# Patient Record
Sex: MALE | Race: WHITE | NOT HISPANIC OR LATINO | Employment: OTHER | URBAN - METROPOLITAN AREA
[De-identification: names, ages, dates, MRNs, and addresses within clinical notes are randomized per-mention and may not be internally consistent; named-entity substitution may affect disease eponyms.]

---

## 2017-06-08 ENCOUNTER — OFFICE VISIT (OUTPATIENT)
Dept: FAMILY MEDICINE | Facility: CLINIC | Age: 67
End: 2017-06-08
Payer: MEDICARE

## 2017-06-08 VITALS
HEIGHT: 69 IN | DIASTOLIC BLOOD PRESSURE: 78 MMHG | HEART RATE: 71 BPM | WEIGHT: 170 LBS | BODY MASS INDEX: 25.18 KG/M2 | SYSTOLIC BLOOD PRESSURE: 127 MMHG

## 2017-06-08 DIAGNOSIS — R63.4 WEIGHT LOSS: ICD-10-CM

## 2017-06-08 DIAGNOSIS — K70.30 ALCOHOLIC CIRRHOSIS OF LIVER WITHOUT ASCITES: ICD-10-CM

## 2017-06-08 DIAGNOSIS — R05.9 COUGH: Primary | ICD-10-CM

## 2017-06-08 PROBLEM — N52.9 ED (ERECTILE DYSFUNCTION) OF ORGANIC ORIGIN: Status: ACTIVE | Noted: 2017-06-08

## 2017-06-08 PROBLEM — K74.60 HEPATIC CIRRHOSIS: Status: ACTIVE | Noted: 2017-06-08

## 2017-06-08 PROBLEM — Z79.899 OTHER LONG TERM (CURRENT) DRUG THERAPY: Status: ACTIVE | Noted: 2017-06-08

## 2017-06-08 PROBLEM — F17.200 CURRENT SMOKER: Status: ACTIVE | Noted: 2017-06-08

## 2017-06-08 PROBLEM — G25.81 RESTLESS LEG: Status: ACTIVE | Noted: 2017-06-08

## 2017-06-08 PROCEDURE — 99214 OFFICE O/P EST MOD 30 MIN: CPT | Mod: ,,, | Performed by: INTERNAL MEDICINE

## 2017-06-08 PROCEDURE — 1126F AMNT PAIN NOTED NONE PRSNT: CPT | Mod: ,,, | Performed by: INTERNAL MEDICINE

## 2017-06-08 PROCEDURE — 1159F MED LIST DOCD IN RCRD: CPT | Mod: ,,, | Performed by: INTERNAL MEDICINE

## 2017-06-08 RX ORDER — LACTULOSE 10 G/15ML
10 SOLUTION ORAL; RECTAL
COMMUNITY
Start: 2016-12-08 | End: 2021-10-29 | Stop reason: SDUPTHER

## 2017-06-08 RX ORDER — ATENOLOL 25 MG/1
TABLET ORAL
COMMUNITY
Start: 2016-12-08 | End: 2018-01-30

## 2017-06-08 RX ORDER — FUROSEMIDE 20 MG/1
TABLET ORAL
COMMUNITY
Start: 2016-12-08 | End: 2018-01-30

## 2017-06-08 RX ORDER — SPIRONOLACTONE 50 MG/1
TABLET, FILM COATED ORAL
COMMUNITY
Start: 2016-12-08 | End: 2018-01-30

## 2017-06-08 RX ORDER — NEOMYCIN SULFATE 500 MG/1
TABLET ORAL
COMMUNITY
Start: 2015-09-29

## 2017-06-08 NOTE — PATIENT INSTRUCTIONS
Cirrhosis    The liver is found on the right side of your abdomen, just below the rib cage. The liver has many essential functions. Among these, it filters toxins from the blood. It also helps blood clot to stop bleeding. Cirrhosis results from scarring and injury to the liver. This damage is permanent. It can lead to loss of liver function. At some point, the liver may stop working (liver failure).   Long-term heavy alcohol use and having hepatitis B or C are the two most common causes of cirrhosis. Other things that can damage the liver include toxins, certain medicines, and certain viruses.  Common symptoms of cirrhosis include:  · Tiredness, weakness  · Loss of appetite  · Nausea and vomiting  · Easy bleeding and bruising  · Abdominal swelling  · Weight loss  · Jaundice  · Itching  · Confusion  Treatment is aimed at managing symptoms and preventing further liver damage. Treatments may be given to fight the hepatitis virus. Quitting alcohol will help slow the progress of the disease and may prevent further complications. If cirrhosis progresses and becomes life threatening, a liver transplant may be an option in some cases.   Home care  · Avoid medicines that can worsen liver damage.  Your healthcare provider will explain if any of the medicines you now take need to be changed. Talk to you healthcare provider before taking any medicine, including mineral and vitamin supplements or herbs. Certain substances can worsen liver damage.  · Talk to your healthcare provider avoiding medicines containing acetaminophen or NSAIDs (such as ibuprofen and naproxen). These can affect your liver.   · Stop drinking alcohol. If you are dependent on alcohol or find it hard to stop drinking, seek professional help. Consider joining Alcoholics Anonymous or another type of treatment program for support.  · If you use IV drugs, you are at high risk for hepatitis B and C. Seek help to stop.   Follow-up care  Follow up with your  healthcare provider or as advised by our staff.  For more information and to learn about support groups for people with liver disease, contact:  · [employer]  www.liverfoundation.org  635.487.9850  · [facility]  www.hepfi.org  084- 893-1029  When to seek medical advice  Call your healthcare provider for any of the following:  · Rapid weight gain with increased size of your abdomen or leg swelling  · Increasing jaundice (yellow color of skin or eyes)  · Excess bleeding from cuts or injuries  Date Last Reviewed: 6/22/2015  © 7480-3354 [employer]. [address] [place] [address]. All rights reserved. This information is not intended as a substitute for professional medical care. Always follow your healthcare professional's instructions.

## 2017-06-08 NOTE — PROGRESS NOTES
Subjective:       Patient ID: Adam Aguirre is a 66 y.o. male.    Chief Complaint: Cirrhosis (fup)    Mr. Adam Cifuentes is a 66-year-old  male who comes for follow-up. He has underlying history of cirrhosis of liver secondary to alcohol use. Currently he is abstinent and is doing fairly well with his medical conditions. He occasionally takes furosemide/spironolactone if his leg swelling increases.    We have been attempting to get lab work on him but for some reason labs keep on reporting as unsuitable specimen.    Patient gastroenterologist is Dr. Pérez.    Patient also has a cough which is not considered since the last few years. He is not on ACE inhibitors. He is a chronic smoker.      Cough   This is a chronic problem. The current episode started more than 1 year ago. The problem has been waxing and waning. The cough is non-productive. Pertinent negatives include no chills, fever, rash or shortness of breath. The treatment provided no relief. There is no history of environmental allergies.       Past Medical History:   Diagnosis Date    Cirrhosis     Dr. Pérze    ED (erectile dysfunction)     RLS (restless legs syndrome)      Social History     Social History    Marital status:      Spouse name: N/A    Number of children: N/A    Years of education: N/A     Occupational History    Not on file.     Social History Main Topics    Smoking status: Current Every Day Smoker     Packs/day: 1.00     Types: Cigarettes    Smokeless tobacco: Not on file      Comment: advised to quit    Alcohol use Yes    Drug use: No    Sexual activity: Yes     Partners: Female     Other Topics Concern    Not on file     Social History Narrative    No narrative on file     Past Surgical History:   Procedure Laterality Date    HEMORRHOID SURGERY      TONSILLECTOMY       Family History   Problem Relation Age of Onset    No Known Problems Mother     Heart disease Father     Prostate cancer Brother        Review of  Systems   Constitutional: Positive for unexpected weight change (patient has lost imately 16 pounds of weight since the last 3 years.). Negative for activity change, appetite change, chills, fatigue and fever.   HENT: Negative for congestion, sneezing and trouble swallowing.    Eyes: Negative for pain and visual disturbance.   Respiratory: Negative for cough, chest tightness and shortness of breath.    Cardiovascular: Negative for palpitations and leg swelling.   Gastrointestinal: Negative for abdominal distention, abdominal pain, blood in stool, constipation and diarrhea.        Patient has history of alcoholic cirrhosis of liver which is under control.   Endocrine: Negative for cold intolerance, heat intolerance, polydipsia, polyphagia and polyuria.   Genitourinary: Negative for dysuria, hematuria and scrotal swelling.   Musculoskeletal: Negative for arthralgias, back pain and gait problem.   Skin: Negative for pallor, rash and wound.   Allergic/Immunologic: Negative for environmental allergies, food allergies and immunocompromised state.   Neurological: Negative for dizziness, seizures, speech difficulty, light-headedness and numbness.   Hematological: Negative for adenopathy. Does not bruise/bleed easily.   Psychiatric/Behavioral: Negative for agitation, behavioral problems and confusion. The patient is not nervous/anxious.        Objective:      Physical Exam   Constitutional: He is oriented to person, place, and time. He appears well-developed.   HENT:   Head: Normocephalic and atraumatic.   Nose: Nose normal.   Mouth/Throat: Oropharynx is clear and moist. He has dentures (patient has both upper and lower dentures which are fitting jeferson.sonably well.). No oropharyngeal exudate.   Eyes: Conjunctivae and EOM are normal.   Neck: Normal range of motion. Neck supple. No JVD present. No tracheal deviation present. No thyromegaly present.   Cardiovascular: Normal rate, regular rhythm and normal heart sounds.  Exam  reveals no gallop and no friction rub.    No murmur heard.  Pulmonary/Chest: Effort normal and breath sounds normal. No respiratory distress. He has no wheezes. He has no rales.   Abdominal: Soft. Bowel sounds are normal. He exhibits no fluid wave, no ascites and no pulsatile midline mass. There is no hepatosplenomegaly. There is no tenderness. No hernia.   Musculoskeletal: Normal range of motion.   Neurological: He is alert and oriented to person, place, and time. He has normal reflexes.   Skin: Skin is warm and dry.   Some telangiectasia and spider veins are noted on the abdominal skin.   Psychiatric: He has a normal mood and affect.   Nursing note and vitals reviewed.      Assessment:       1. Cough    2. Alcoholic cirrhosis of liver without ascites    3. Weight loss         Plan:           Cough    Alcoholic cirrhosis of liver without ascites  -     Ammonia; Future; Expected date: 06/08/2017  -     CBC auto differential; Future; Expected date: 06/08/2017  -     Comprehensive metabolic panel; Future; Expected date: 06/08/2017  -     Prothrombin w/ INR and PTT; Future; Expected date: 06/08/2017  -     Ambulatory referral to Gastroenterology    Weight loss    At this point patient is doing fairly well. Labs are pending. He has a weight loss of approximately 16 pounds which is unaccounted for at this point. I'll review the initial labs for direction towards further investigations if needed. In the meantime he should see a gastrologist and update himself on colonoscopy/endoscopy if needed.    Patient has been advised to quit smoking. Chest x-ray will be done for cough.

## 2017-07-18 LAB
ALBUMIN SERPL-MCNC: 3.7 G/DL (ref 3.1–4.7)
ALP SERPL-CCNC: 94 IU/L (ref 40–104)
ALT (SGPT): 18 IU/L (ref 3–33)
APTT PPP: 38.3 SEC (ref 21.7–37.8)
AST SERPL-CCNC: 22 IU/L (ref 10–40)
BASOPHILS NFR BLD: 0 K/UL (ref 0–0.2)
BASOPHILS NFR BLD: 0.3 %
BILIRUB SERPL-MCNC: 1.2 MG/DL (ref 0.3–1)
BUN SERPL-MCNC: 9 MG/DL (ref 8–20)
CALCIUM SERPL-MCNC: 9 MG/DL (ref 7.7–10.4)
CHLORIDE: 99 MMOL/L (ref 98–110)
CO2 SERPL-SCNC: 28.5 MMOL/L (ref 22.8–31.6)
CREATININE: 0.8 MG/DL (ref 0.6–1.4)
EOSINOPHIL NFR BLD: 0.1 K/UL (ref 0–0.7)
EOSINOPHIL NFR BLD: 1.2 %
ERYTHROCYTE [DISTWIDTH] IN BLOOD BY AUTOMATED COUNT: 12.9 % (ref 11.7–14.9)
GLUCOSE: 93 MG/DL (ref 70–99)
GRAN #: 3.6 K/UL (ref 1.4–6.5)
GRAN%: 59.3 %
HCT VFR BLD AUTO: 43.9 % (ref 39–55)
HGB BLD-MCNC: 15.5 G/DL (ref 14–16)
IMMATURE GRANS (ABS): 0 K/UL (ref 0–1)
IMMATURE GRANULOCYTES: 0.3 %
INR PPP: 1.1
LYMPH #: 1.7 K/UL (ref 1.2–3.4)
LYMPH%: 27.8 %
MCH RBC QN AUTO: 35.5 PG (ref 25–35)
MCHC RBC AUTO-ENTMCNC: 35.3 G/DL (ref 31–36)
MCV RBC AUTO: 100.5 FL (ref 80–100)
MONO #: 0.7 K/UL (ref 0.1–0.6)
MONO%: 11.1 %
NUCLEATED RBCS: 0 %
PLATELET # BLD AUTO: 154 K/UL (ref 140–440)
PMV BLD AUTO: 9.8 FL (ref 8.8–12.7)
POTASSIUM SERPL-SCNC: 4.2 MMOL/L (ref 3.5–5)
PROT SERPL-MCNC: 7.2 G/DL (ref 6–8.2)
PROTHROMBIN TIME: 14.4 SEC (ref 11.3–15.2)
RBC # BLD AUTO: 4.37 M/UL (ref 4.3–5.9)
SODIUM: 134 MMOL/L (ref 134–144)
WBC # BLD AUTO: 6 K/UL (ref 5–10)

## 2017-08-21 PROBLEM — K57.30 DIVERTICULOSIS OF SIGMOID COLON: Status: ACTIVE | Noted: 2017-08-21

## 2017-08-21 PROBLEM — D36.9 ADENOMATOUS POLYPS: Status: ACTIVE | Noted: 2017-08-21

## 2017-09-01 PROBLEM — I85.10 SECONDARY ESOPHAGEAL VARICES WITHOUT BLEEDING: Status: ACTIVE | Noted: 2017-09-01

## 2018-01-25 ENCOUNTER — TELEPHONE (OUTPATIENT)
Dept: FAMILY MEDICINE | Facility: CLINIC | Age: 68
End: 2018-01-25

## 2018-01-26 DIAGNOSIS — K70.30 ALCOHOLIC CIRRHOSIS OF LIVER WITHOUT ASCITES: Primary | ICD-10-CM

## 2018-01-26 LAB
ALBUMIN SERPL-MCNC: 3.9 G/DL (ref 3.1–4.7)
ALP SERPL-CCNC: 93 IU/L (ref 40–104)
ALT (SGPT): 19 IU/L (ref 3–33)
AST SERPL-CCNC: 26 IU/L (ref 10–40)
BASOPHILS NFR BLD: 0 K/UL (ref 0–0.2)
BASOPHILS NFR BLD: 0.6 %
BILIRUB SERPL-MCNC: 1.1 MG/DL (ref 0.3–1)
BUN SERPL-MCNC: 7 MG/DL (ref 8–20)
CALCIUM SERPL-MCNC: 9 MG/DL (ref 7.7–10.4)
CHLORIDE: 101 MMOL/L (ref 98–110)
CO2 SERPL-SCNC: 30.4 MMOL/L (ref 22.8–31.6)
CREATININE: 0.81 MG/DL (ref 0.6–1.4)
EOSINOPHIL NFR BLD: 0.2 K/UL (ref 0–0.7)
EOSINOPHIL NFR BLD: 3.3 %
ERYTHROCYTE [DISTWIDTH] IN BLOOD BY AUTOMATED COUNT: 13.7 % (ref 11.7–14.9)
GLUCOSE: 92 MG/DL (ref 70–99)
GRAN #: 2.1 K/UL (ref 1.4–6.5)
GRAN%: 43.3 %
HCT VFR BLD AUTO: 39.4 % (ref 39–55)
HGB BLD-MCNC: 14.9 G/DL (ref 14–16)
IMMATURE GRANS (ABS): 0 K/UL (ref 0–1)
IMMATURE GRANULOCYTES: 0 %
LYMPH #: 2 K/UL (ref 1.2–3.4)
LYMPH%: 41.6 %
MCH RBC QN AUTO: 40.4 PG (ref 25–35)
MCHC RBC AUTO-ENTMCNC: 37.8 G/DL (ref 31–36)
MCV RBC AUTO: 106.8 FL (ref 80–100)
MONO #: 0.5 K/UL (ref 0.1–0.6)
MONO%: 11.2 %
NUCLEATED RBCS: 0 %
PLATELET # BLD AUTO: 165 K/UL (ref 140–440)
PMV BLD AUTO: 8.7 FL (ref 8.8–12.7)
POTASSIUM SERPL-SCNC: 4.1 MMOL/L (ref 3.5–5)
PROT SERPL-MCNC: 7.2 G/DL (ref 6–8.2)
RBC # BLD AUTO: 3.69 M/UL (ref 4.3–5.9)
SODIUM: 139 MMOL/L (ref 134–144)
WBC # BLD AUTO: 4.8 K/UL (ref 5–10)

## 2018-01-30 ENCOUNTER — OFFICE VISIT (OUTPATIENT)
Dept: FAMILY MEDICINE | Facility: CLINIC | Age: 68
End: 2018-01-30
Payer: MEDICARE

## 2018-01-30 VITALS
HEIGHT: 69 IN | SYSTOLIC BLOOD PRESSURE: 138 MMHG | DIASTOLIC BLOOD PRESSURE: 74 MMHG | BODY MASS INDEX: 24.59 KG/M2 | HEART RATE: 71 BPM | WEIGHT: 166 LBS

## 2018-01-30 DIAGNOSIS — R25.1 TREMOR OF RIGHT HAND: ICD-10-CM

## 2018-01-30 DIAGNOSIS — K70.30 ALCOHOLIC CIRRHOSIS OF LIVER WITHOUT ASCITES: Primary | ICD-10-CM

## 2018-01-30 DIAGNOSIS — D53.1 MEGALOBLASTIC RED BLOOD CELLS: ICD-10-CM

## 2018-01-30 DIAGNOSIS — D64.9 ANEMIA, UNSPECIFIED TYPE: ICD-10-CM

## 2018-01-30 DIAGNOSIS — E53.8 VITAMIN B12 DEFICIENCY: ICD-10-CM

## 2018-01-30 DIAGNOSIS — G25.81 RLS (RESTLESS LEGS SYNDROME): ICD-10-CM

## 2018-01-30 PROCEDURE — 1126F AMNT PAIN NOTED NONE PRSNT: CPT | Mod: ,,, | Performed by: INTERNAL MEDICINE

## 2018-01-30 PROCEDURE — 1159F MED LIST DOCD IN RCRD: CPT | Mod: ,,, | Performed by: INTERNAL MEDICINE

## 2018-01-30 PROCEDURE — 99214 OFFICE O/P EST MOD 30 MIN: CPT | Mod: ,,, | Performed by: INTERNAL MEDICINE

## 2018-01-30 PROCEDURE — 1170F FXNL STATUS ASSESSED: CPT | Mod: ,,, | Performed by: INTERNAL MEDICINE

## 2018-01-30 RX ORDER — ROPINIROLE 0.25 MG/1
0.25 TABLET, FILM COATED ORAL 3 TIMES DAILY
Qty: 30 TABLET | Refills: 4 | Status: SHIPPED | OUTPATIENT
Start: 2018-01-30 | End: 2018-03-01 | Stop reason: SDUPTHER

## 2018-01-30 NOTE — PROGRESS NOTES
Subjective:       Patient ID: Adam Aguirre is a 67 y.o. male.    Chief Complaint: Cirrhosis (lab review ); Shaking (restless leg ); and Tremors    Pt comes for follow up on alcoholic liver disease cirrhosis.  His condition is getting better gradually and has stopped most medications including diuretics, Atenolol.and Neomycin    He has RLS symptoms and some shakes in rt arm/hand    CBC shows elevated MCV. ( H/O cirrhosis)        Past Medical History:   Diagnosis Date    Cirrhosis     Dr. Pérez    Cirrhosis, alcoholic     off alcohol now    ED (erectile dysfunction)     RLS (restless legs syndrome)      Social History     Social History    Marital status:      Spouse name: N/A    Number of children: N/A    Years of education: N/A     Occupational History    retired [employer]      Social History Main Topics    Smoking status: Current Every Day Smoker     Packs/day: 0.50     Types: Cigarettes    Smokeless tobacco: Never Used      Comment: advised to quit    Alcohol use No      Comment: quit drinking 2008 March 25th    Drug use: No    Sexual activity: Yes     Partners: Female     Other Topics Concern    Not on file     Social History Narrative    No narrative on file     Past Surgical History:   Procedure Laterality Date    HEMORRHOID SURGERY      TONSILLECTOMY       Family History   Problem Relation Age of Onset    No Known Problems Mother     Heart disease Father     Prostate cancer Brother        Review of Systems   Constitutional: Positive for unexpected weight change (patient has lost imately 16+4 pounds of weight since the last 3 years..). Negative for activity change, appetite change, chills, fatigue and fever.   HENT: Negative for congestion, sneezing and trouble swallowing.    Eyes: Negative for pain and visual disturbance.   Respiratory: Negative for cough, chest tightness and shortness of breath.    Cardiovascular: Negative for palpitations and leg swelling.  "  Gastrointestinal: Negative for abdominal distention, abdominal pain, blood in stool, constipation and diarrhea.        Patient has history of alcoholic cirrhosis of liver which is under control.   Endocrine: Negative for cold intolerance, heat intolerance, polydipsia, polyphagia and polyuria.   Genitourinary: Negative for dysuria, hematuria and scrotal swelling.   Musculoskeletal: Negative for arthralgias, back pain and gait problem.   Skin: Negative for pallor, rash and wound.   Allergic/Immunologic: Negative for environmental allergies, food allergies and immunocompromised state.   Neurological: Positive for tremors (rt hand). Negative for dizziness, seizures, speech difficulty, light-headedness and numbness.        RLS symptoms and tremors rt hand   Hematological: Negative for adenopathy. Does not bruise/bleed easily.   Psychiatric/Behavioral: Negative for agitation, behavioral problems and confusion. The patient is not nervous/anxious.        Objective:       Vitals:    01/30/18 1318   BP: 138/74   Pulse: 71   Weight: 75.3 kg (166 lb)   Height: 5' 9" (1.753 m)     Physical Exam   Constitutional: He appears well-developed.   HENT:   Head: Normocephalic and atraumatic.   Nose: Nose normal.   Mouth/Throat: Oropharynx is clear and moist. He has dentures (patient has both upper and lower dentures which are fitting jeferson.sonably well.). No oropharyngeal exudate.   Eyes: Conjunctivae and EOM are normal.   Neck: Normal range of motion. Neck supple. No JVD present. No tracheal deviation present. No thyromegaly present.   Cardiovascular: Normal rate, regular rhythm and normal heart sounds.  Exam reveals no gallop and no friction rub.    No murmur heard.  Pulmonary/Chest: Effort normal and breath sounds normal. No respiratory distress. He has no wheezes. He has no rales.   Abdominal: Soft. Bowel sounds are normal. He exhibits no fluid wave, no ascites and no pulsatile midline mass. There is no hepatosplenomegaly. There is " no tenderness. No hernia.   Musculoskeletal: Normal range of motion.   Neurological: He is alert. He displays tremor.   Rt hand coarse tremor   Skin: Skin is warm and dry.   Some telangiectasia and spider veins are noted on the abdominal skin.   Psychiatric: He has a normal mood and affect.   Nursing note and vitals reviewed.      Assessment:       1. Alcoholic cirrhosis of liver without ascites    2. RLS (restless legs syndrome)    3. Tremor of right hand    4. Megaloblastic red blood cells    5. Anemia, unspecified type    6. Vitamin B12 deficiency      Adam Aguirre #[id number] (CSN: [id number])  (67 y.o. M)     Results    Comprehensive metabolic panel (Acc# [id number]) (Order [id number])   In Basket     Reviewed  Result Note  View in In Basket    MyChart Results Release     MyChart Status: Pending Results Release      Comprehensive metabolic panel   Order: [id number]   Status:  Final result   Visible to patient:  No (Not Released)   Next appt:  None   Dx:  Alcoholic cirrhosis of liver without ...    Ref Range & Units 4d ago   Glucose 70 - 99 mg/dL 92    BUN, Bld 8 - 20 mg/dL 7     Creatinine 0.60 - 1.40 mg/dL 0.81    Calcium 7.7 - 10.4 mg/dL 9.0    Sodium 134 - 144 mmol/L 139    Potassium 3.5 - 5.0 mmol/L 4.1    Chloride 98 - 110 mmol/L 101    CO2 22.8 - 31.6 mmol/L 30.4    Albumin 3.1 - 4.7 g/dL 3.9    Total Bilirubin 0.3 - 1.0 mg/dL 1.1     Alkaline Phosphatase 40 - 104 IU/L 93    Total Protein 6.0 - 8.2 g/dL 7.2    ALT (SGPT) 3 - 33 IU/L 19    AST 10 - 40 IU/L 26    Resulting Agency  [facility]      Specimen Collected: 01/26/18 15:08 Last Resulted: 01/26/18 15:35 Lab Flowsheet Order Details View Encounter Lab and Collection Details Routing Result History               Estimated Glomerular Filtration Rate   Order: [id number] - Reflex for Order [id number]   Status:  Final result   Visible to patient:  No (Not Released)   Next appt:  None   Narrative                                  VALUE             REFERENCE RANGE  UNITS                                          ----------------------------------------------------------------------------------  Estimated GFR                  95                               ml/min/1.73m2              WBC 5.0 - 10.0 K/uL 6.0    RBC 4.30 - 5.90 M/uL 4.37    Hemoglobin 14.0 - 16.0 g/dL 15.5    Hematocrit 39.0 - 55.0 % 43.9    MCV 80.0 - 100.0 fL 100.5     MCH 25.0 - 35.0 pg 35.5     MCHC 31.0 - 36.0 g/dL 35.3    RDW 11.7 - 14.9 % 12.9    Platelets 140 - 440 K/uL 154    MPV 8.8 - 12.7 fL 9.8    Gran% % 59.3    Lymph% % 27.8    Mono% % 11.1    Eosinophil% % 1.2    Basophil% % 0.3    Gran # (ANC) 1.4 - 6.5 K/uL 3.6    Lymph # 1.2 - 3.4 K/uL 1.7    Mono # 0.1 - 0.6 K/uL 0.7     Eos # 0.0 - 0.7 K/uL 0.1    Baso # 0.0 - 0.2 K/uL 0.0    Immature Grans (Abs) 0.0 - 1.0 K/uL 0.0    Immature Granulocytes % 0.3    nRBC% % 0      Ammonia   Order: [id number]   Status:  Final result   Visible to patient:  No (Not Released)   Next appt:  None   Dx:  Alcoholic cirrhosis of liver without ...   Narrative                                  VALUE             REFERENCE RANGE  UNITS                                         ----------------------------------------------------------------------------------  Ammonia                        10                         8-58  umol/L          Specimen Collected: 01/26/18 15:08 Last Resulted: 01/26/18 15:36 Order Details View Encounter Lab and Collection                    Plan:           Alcoholic cirrhosis of liver without ascites    RLS (restless legs syndrome)  -     Ferritin; Future; Expected date: 01/30/2018  -     rOPINIRole (REQUIP) 0.25 MG tablet; Take 1 tablet (0.25 mg total) by mouth 3 (three) times daily.  Dispense: 30 tablet; Refill: 4  -     Iron; Future; Expected date: 01/30/2018    Tremor of right hand    Megaloblastic red blood cells    Anemia, unspecified type  -     Ferritin; Future; Expected date: 01/30/2018  -     Iron; Future; Expected date: 01/30/2018    Vitamin B12  deficiency  -     Vitamin B12; Future; Expected date: 01/30/2018    Fup 1 month

## 2018-01-30 NOTE — PATIENT INSTRUCTIONS
Restless Legs Syndrome: What You Can Do  Symptoms of restless leg syndrome (RLS) can be treated. Together, you and your health care provider can work on your treatment plan. If needed, medications may be prescribed. Also learn what you can do to ease your discomfort. Good sleep habits and a healthy lifestyle will help you rest better at night and have more energy during the day.    Working with your health care provider  RLS may occur on its own and may be passed on in families. It is sometimes linked to other medical problems. Low iron may cause some RLS symptoms. Your health care provider may order a lab test to check your iron level. Other medical problems associated with RLS are kidney disease, diabetes, and multiple sclerosis. Your doctor may prescribe medications to reduce your symptoms and help you sleep better.  Tips for temporary relief  To reduce your discomfort, try the following:  · Walking or stretching  · Rubbing your legs  · Having a massage  · Taking a hot or cold bath  · Doing activities that make muscles in your hands or legs work  · Relaxing with yoga or meditation  Good sleep habits  Even though you have RLS, you can still have restful sleep. Try these good sleeping habits:  · Keep a regular sleep schedule. Go to bed and get up at the same time each day.  · Avoid or limit naps.  · Make sure the bedroom is quiet, dark, and not too hot or too cold.  · Use your bed only for sleep and sex.  Healthy lifestyle  Your lifestyle affects your health and your sleep. Here are some healthy habits:  · Eat a balanced diet. To get enough vitamins and minerals, you may also need to take supplements.  · Manage stress and learn ways to relax. Deep breathing techniques and visualization can help to relax your muscles and calm your mind.  · Exercise regularly. It can help reduce stress. Also, you will have more energy during the day and be more tired at bedtime. Afternoon exercise is best. Nighttime exercise may  affect how well you sleep.  · Avoid alcohol, nicotine, and caffeine.  Date Last Reviewed: 6/7/2015  © 4579-9455 [employer]. [address] [place] [address], [place] [address]. All rights reserved. This information is not intended as a substitute for professional medical care. Always follow your healthcare professional's instructions.        Understanding Restless Legs Syndrome    Are you ever annoyed by a creeping or itching feeling in your legs? Do you often feel an urge to move your legs while sitting or lying in bed? This can keep you from falling asleep at night. You may then feel tired during the day. If you have these problems, talk to your health care provider. He or she can suggest a treatment plan and help you find ways to sleep better.  Restless legs syndrome (RLS)  RLS is a creeping, crawly, or jumpy feeling in the legs with an urge to move them. Symptoms of RLS often occur during periods of inactivity, such as when you sit or lie down at night. This discomfort can keep you from falling asleep. RLS is more common in older people and tends to run in families. Overuse of caffeine or alcohol may make symptoms worse. Iron deficiency, diabetes, or kidney problems can contribute to RLS.  Periodic limb movement syndrome (PLMS)  PLMS is sudden, repetitive leg jerking during sleep. The person you sleep with is often the one who notices it. Your legs may jerk many times during the night. You and your partner may both have trouble sleeping and feel tired in the morning. PLMS shouldnt be confused with the normal leg or body twitching many people have when first falling asleep.  Treating these problems  If these problems are causing disrupted sleep and daytime symptoms, treatment may be needed. Possible treatments may include:  · Avoiding medications like antidepressants, antinausea medications, and antipsychotic medications.   · Prescribed medications.  · Lifestyle changes, such as controlling caffeine intake,  alcohol, and smoking.  Date Last Reviewed: 7/18/2015  © 8036-5535 [employer]. [address] [place] [address], [place] [address]. All rights reserved. This information is not intended as a substitute for professional medical care. Always follow your healthcare professional's instructions.        Essential Tremor (ET)  What is essential tremor?  Essential tremor (ET) is a neurological disorder that causes your hands, head, trunk, voice, or legs to shake rhythmically. It is often confused with Parkinson disease.  ET is the most common trembling disorder that people have. Everyone has some ET, but the movements usually cannot be seen or felt. When tremors are noticeable, the condition is classified as ET.  ET is most common among people older than age 65, but it can affect people at any age.  What causes ET?  ET can occur in different people for different reasons:  · Familial essential tremor. In most people, the condition seems to be passed down from a parent to a child. If your parent has ET, there is a 50% chance that you or your children will inherit the gene responsible for the condition.  · Essential tremor related to another disorder. Sometimes, a tremor is a symptom of another neurological disorder, such as Parkinson disease or dystonia. Sometimes, ET is mistaken for these other diseases when they are not present. A healthcare providers careful diagnosis is extremely important.  The cause of ET isnt known. However, one theory suggests that your cerebellum and other parts of your brain are not communicating correctly. The cerebellum is a part of the brain that controls muscle coordination.  What are the symptoms of ET?  If you have ET, you will have shaking and trembling at different times and in different situations, but some characteristics are common to all. Here is what you might typically experience:  · Tremors occur when you move and are less noticeable when you rest.  · Certain medicines,  caffeine, or stress can make your tremors worse.  · Tremor may improve with ingestion of a small amount of alcohol (such as wine)  · Tremors get worse as you age.  · Tremors dont affect both sides of your body in the same way.  Here are different signs of essential tremor:  · Tremors that are most obvious in your hands  · Difficulty doing tasks with your hands, such as writing or using tools  · Shaking or quivering sound in your voice  · Uncontrollable head-nodding  · In rare instances, tremors in your legs or feet  How is ET diagnosed?  Your rapid, uncontrollable trembling, as well as questions about your medical and family history, can help your healthcare provider determine if you have familial ET. He or she will probably need to rule out other conditions that could cause shaking or trembling. For example, tremors could be symptoms of diseases, such as hyperthyroidism. Your healthcare provider might test you for those, as well.  In some cases, the tremors might be related to other factors. To find out for certain, your healthcare provider may have you try to:  · Abstain from heavy alcohol use; if youre an alcoholic, trembling is a common symptom.  · Cut out cigarette smoking.  · Avoid caffeine.  · Avoid certain medicines  How is ET treated?   Propanolol and primidone are 2 medicines often prescribed to treat ET. Propanolol blocks the stimulating action of neurotransmitters to calm your trembling. Primidone is a common antiseizure medicine that also controls the actions of neurotransmitters.  Gabapentin and topiramate are 2 other antiseizure medicines that are sometimes prescribed. In some cases, tranquilizers like alprazolam or clonazepam might be suggested.  For ET in your hands, botulinum toxin (Botox) injections have shown some promise in easing the trembling. They work by weakening the surrounding muscles around your hands. For severe tremors, a stimulating device (deep brain stimulator) surgically  implanted in your brain may help.  Can ET be prevented?   The specific cause of ET is not known, so scientists are not sure how the condition can be prevented.  Living with ET  ET is usually not dangerous, but it can certainly be frustrating if you have to deal with it. Certain factors can make tremors worse, so the following steps may help to decrease tremors:  · Avoid alcohol, cigarettes, and caffeine  · Avoid stressful situations as much as possible  · Use relaxation techniques, such as yoga, deep-breathing exercises, or biofeedback  · Check with your healthcare provider to see if any medicines youre taking could be making your tremors worse.  Talk with your healthcare provider about other options, such as surgery, if ET starts to affect your quality of life.  When should I call my healthcare provider?  If you have been diagnosed with ET, talk with your healthcare provider about when you might need to call. He or she will likely advise you to call if your tremors become worse, or if you develop new neurologic symptoms, such as numbness or weakness.  Key points about essential tremors  · ET is a neurological disorder that causes your hands, head, trunk, voice, or legs to shake rhythmically. The cause is not known, but it is often passed down from a parent to a child.  · ET is sometimes confused with other types of tremor, so getting the right diagnosis is important.  · Tremors tend to be worse during movement than when at rest. The tremors are usually not dangerous, but they can get worse over time.  · Avoiding things that might make tremors worse, such as stress, caffeine, and certain medicines, may be helpful.  · Medicines can also help control or limit tremors in some people. Severe tremors can sometimes be treated with surgery.  Next steps  Tips to help you get the most from a visit to your healthcare provider:  · Know the reason for your visit and what you want to happen.  · Before your visit, write down  questions you want answered.  · Bring someone with you to help you ask questions and remember what your provider tells you.  · At the visit, write down the name of a new diagnosis, and any new medicines, treatments, or tests. Also write down any new instructions your provider gives you.  · Know why a new medicine or treatment is prescribed, and how it will help you. Also know what the side effects are.  · Ask if your condition can be treated in other ways.  · Know why a test or procedure is recommended and what the results could mean.  · Know what to expect if you do not take the medicine or have the test or procedure.  · If you have a follow-up appointment, write down the date, time, and purpose for that visit.  · Know how you can contact your provider if you have questions.  © 1630-6797 [facility]. [address] [place] [address], [place] [address]. All rights reserved. This information is not intended as a substitute for professional medical care. Always follow your healthcare professional's instructions.

## 2018-02-28 LAB
FERRITIN SERPL-MCNC: 211 NG/ML (ref 20–380)
IRON SERPL-MCNC: 125 MCG/DL (ref 50–180)
VIT B12 SERPL-MCNC: 566 PG/ML (ref 200–1100)

## 2018-03-01 ENCOUNTER — OFFICE VISIT (OUTPATIENT)
Dept: FAMILY MEDICINE | Facility: CLINIC | Age: 68
End: 2018-03-01
Payer: MEDICARE

## 2018-03-01 VITALS
WEIGHT: 167 LBS | SYSTOLIC BLOOD PRESSURE: 134 MMHG | BODY MASS INDEX: 24.73 KG/M2 | HEART RATE: 77 BPM | HEIGHT: 69 IN | DIASTOLIC BLOOD PRESSURE: 79 MMHG

## 2018-03-01 DIAGNOSIS — G25.81 RLS (RESTLESS LEGS SYNDROME): ICD-10-CM

## 2018-03-01 DIAGNOSIS — K70.30 ALCOHOLIC CIRRHOSIS OF LIVER WITHOUT ASCITES: Primary | ICD-10-CM

## 2018-03-01 DIAGNOSIS — D53.1 MEGALOBLASTIC RED BLOOD CELLS: ICD-10-CM

## 2018-03-01 DIAGNOSIS — R25.1 TREMOR OF RIGHT HAND: ICD-10-CM

## 2018-03-01 PROCEDURE — 99213 OFFICE O/P EST LOW 20 MIN: CPT | Mod: ,,, | Performed by: INTERNAL MEDICINE

## 2018-03-01 RX ORDER — MAGNESIUM 200 MG
1 TABLET ORAL DAILY
Qty: 100 TABLET | Refills: 3 | Status: SHIPPED | OUTPATIENT
Start: 2018-03-01

## 2018-03-01 RX ORDER — ROPINIROLE 0.25 MG/1
0.25 TABLET, FILM COATED ORAL NIGHTLY
Qty: 30 TABLET | Refills: 4
Start: 2018-03-01 | End: 2018-09-04 | Stop reason: SDUPTHER

## 2018-03-01 NOTE — PROGRESS NOTES
Subjective:       Patient ID: Adam Aguirre is a 67 y.o. male.    Chief Complaint: Cirrhosis (lab review ); Shaking; and Abnormal Lab    Mr. Medina is a pleasant 67-year-old  male who comes for follow-up. He has underlying cirrhosis of liver secondary to alcoholism. He has been abstinent since several years. Thus far he is improving significantly and steadily. He has stopped most of the medications including lactulose which she takes on a when necessary basis, propranolol and neomycin.    I've taken the opportunity to review his labs which show a persistently megaloblastic picture. He does take a multivitamin. His vitamin B-12 level is within normal range.     He also has symptoms of restless leg syndrome. He takes Requip at bedtime which seems to help him. I also checked his iron and ferritin levels which are within normal range.    Once in a while he gets a jerky movement in the right upper extremity which does not seem to bother him. There is no history of seizures. There is no family history of Parkinson's.        Past Medical History:   Diagnosis Date    Cirrhosis     Dr. Pérez    Cirrhosis, alcoholic     off alcohol now    ED (erectile dysfunction)     RLS (restless legs syndrome)      Social History     Social History    Marital status:      Spouse name: N/A    Number of children: N/A    Years of education: N/A     Occupational History    retired [employer]      Social History Main Topics    Smoking status: Current Every Day Smoker     Packs/day: 0.50     Types: Cigarettes    Smokeless tobacco: Never Used      Comment: advised to quit    Alcohol use No      Comment: quit drinking 2008 March 25th    Drug use: No    Sexual activity: Yes     Partners: Female     Other Topics Concern    Not on file     Social History Narrative    No narrative on file     Past Surgical History:   Procedure Laterality Date    HEMORRHOID SURGERY      TONSILLECTOMY       Family  "History   Problem Relation Age of Onset    No Known Problems Mother     Heart disease Father     Prostate cancer Brother        Review of Systems   Constitutional: Positive for unexpected weight change (Finally patient gained 1 pound of weight after approximately 20 pounds of weight loss.). Negative for activity change, appetite change, chills, fatigue and fever.   HENT: Negative for congestion, sneezing and trouble swallowing.    Eyes: Negative for pain and visual disturbance.   Respiratory: Negative for cough, chest tightness and shortness of breath.    Cardiovascular: Negative for palpitations and leg swelling.   Gastrointestinal: Negative for abdominal distention, abdominal pain, blood in stool, constipation and diarrhea.        Patient has history of alcoholic cirrhosis of liver which is under control.   Endocrine: Negative for cold intolerance, heat intolerance, polydipsia, polyphagia and polyuria.   Genitourinary: Negative for dysuria, hematuria and scrotal swelling.   Musculoskeletal: Negative for arthralgias, back pain and gait problem.   Skin: Negative for pallor, rash and wound.   Allergic/Immunologic: Negative for environmental allergies, food allergies and immunocompromised state.   Neurological: Positive for tremors (rt hand). Negative for dizziness, seizures, speech difficulty, light-headedness and numbness.        RLS symptoms and tremors rt hand   Hematological: Negative for adenopathy. Does not bruise/bleed easily.   Psychiatric/Behavioral: Negative for agitation, behavioral problems and confusion. The patient is not nervous/anxious.        Objective:       Vitals:    03/01/18 1409   BP: 134/79   Pulse: 77   Weight: 75.8 kg (167 lb)   Height: 5' 9" (1.753 m)     Physical Exam   Constitutional: He appears well-developed.   HENT:   Head: Normocephalic and atraumatic.   Nose: Nose normal.   Mouth/Throat: Oropharynx is clear and moist. He has dentures (patient has both upper and lower dentures which " are fitting jeferson.sonably well.). No oropharyngeal exudate.   Eyes: Conjunctivae and EOM are normal.   Neck: Normal range of motion. Neck supple. No JVD present. No tracheal deviation present. No thyromegaly present.   Cardiovascular: Normal rate and regular rhythm.  Exam reveals no gallop and no friction rub.    Murmur heard.   Systolic murmur is present with a grade of 2/6   Pulmonary/Chest: Effort normal and breath sounds normal. No respiratory distress. He has no wheezes. He has no rales.   Abdominal: Soft. Bowel sounds are normal. He exhibits no fluid wave, no ascites and no pulsatile midline mass. There is no hepatosplenomegaly. There is no tenderness. No hernia.   Musculoskeletal: Normal range of motion.   Neurological: He is alert. He displays tremor.   Rt hand coarse tremor   Skin: Skin is warm and dry.   Some telangiectasia and spider veins are noted on the abdominal skin.   Psychiatric: He has a normal mood and affect.   Nursing note and vitals reviewed.      Assessment:       Vitamin B-12 200 - 1,100 pg/mL 566      Iron 50 - 180 mcg/dL 125      Ferritin 20 - 380 ng/mL 211    Ammonia                        10                         8-58  umol/    Ammonia                        10                         8-58  umol/    Glucose 70 - 99 mg/dL 92  93      BUN, Bld 8 - 20 mg/dL 7   9     Creatinine 0.60 - 1.40 mg/dL 0.81  0.80     Calcium 7.7 - 10.4 mg/dL 9.0  9.0     Sodium 134 - 144 mmol/L 139  134     Potassium 3.5 - 5.0 mmol/L 4.1  4.2     Chloride 98 - 110 mmol/L 101  99     CO2 22.8 - 31.6 mmol/L 30.4  28.5     Albumin 3.1 - 4.7 g/dL 3.9  3.7     Total Bilirubin 0.3 - 1.0 mg/dL 1.1   1.2      Alkaline Phosphatase 40 - 104 IU/L 93  94     Total Protein 6.0 - 8.2 g/dL 7.2  7.2     ALT (SGPT) 3 - 33 IU/L 19  18     AST 10 - 40 IU/L 26  22        1. Alcoholic cirrhosis of liver without ascites    2. Tremor of right hand    3. Megaloblastic red blood cells    4. RLS (restless legs syndrome)         Plan:      "      Alcoholic cirrhosis of liver without ascites    Tremor of right hand    Megaloblastic red blood cells  -     cyanocobalamin, vitamin B-12, (VITAMIN B-12) 1,000 mcg Subl; Place 1 tablet under the tongue once daily.  Dispense: 100 tablet; Refill: 3    RLS (restless legs syndrome)  -     rOPINIRole (REQUIP) 0.25 MG tablet; Take 1 tablet (0.25 mg total) by mouth every evening.  Dispense: 30 tablet; Refill: 4    Importance of age-related immunization has been discussed and he will be "discussion perhaps at next visit.    Given his medical last picture I've advised him to take some over-the-counter vitamin B-12.    He continues to remain abstinent from alcohol and he has been complemented on that. At this point he does not seek any active treatment for jerky movements in right hand and this will be kept under observation.    Follow-up in 6 months or earlier.  "

## 2018-03-01 NOTE — PATIENT INSTRUCTIONS
Restless Legs Syndrome: What You Can Do  Symptoms of restless leg syndrome (RLS) can be treated. Together, you and your health care provider can work on your treatment plan. If needed, medications may be prescribed. Also learn what you can do to ease your discomfort. Good sleep habits and a healthy lifestyle will help you rest better at night and have more energy during the day.    Working with your health care provider  RLS may occur on its own and may be passed on in families. It is sometimes linked to other medical problems. Low iron may cause some RLS symptoms. Your health care provider may order a lab test to check your iron level. Other medical problems associated with RLS are kidney disease, diabetes, and multiple sclerosis. Your doctor may prescribe medications to reduce your symptoms and help you sleep better.  Tips for temporary relief  To reduce your discomfort, try the following:  · Walking or stretching  · Rubbing your legs  · Having a massage  · Taking a hot or cold bath  · Doing activities that make muscles in your hands or legs work  · Relaxing with yoga or meditation  Good sleep habits  Even though you have RLS, you can still have restful sleep. Try these good sleeping habits:  · Keep a regular sleep schedule. Go to bed and get up at the same time each day.  · Avoid or limit naps.  · Make sure the bedroom is quiet, dark, and not too hot or too cold.  · Use your bed only for sleep and sex.  Healthy lifestyle  Your lifestyle affects your health and your sleep. Here are some healthy habits:  · Eat a balanced diet. To get enough vitamins and minerals, you may also need to take supplements.  · Manage stress and learn ways to relax. Deep breathing techniques and visualization can help to relax your muscles and calm your mind.  · Exercise regularly. It can help reduce stress. Also, you will have more energy during the day and be more tired at bedtime. Afternoon exercise is best. Nighttime exercise may  affect how well you sleep.  · Avoid alcohol, nicotine, and caffeine.  Date Last Reviewed: 6/7/2015  © 5856-7218 [employer]. [address] [place] [address], [place] [address]. All rights reserved. This information is not intended as a substitute for professional medical care. Always follow your healthcare professional's instructions.

## 2018-09-04 ENCOUNTER — OFFICE VISIT (OUTPATIENT)
Dept: FAMILY MEDICINE | Facility: CLINIC | Age: 68
End: 2018-09-04
Payer: MEDICARE

## 2018-09-04 VITALS
SYSTOLIC BLOOD PRESSURE: 132 MMHG | WEIGHT: 164 LBS | DIASTOLIC BLOOD PRESSURE: 73 MMHG | BODY MASS INDEX: 24.29 KG/M2 | HEIGHT: 69 IN | HEART RATE: 73 BPM

## 2018-09-04 DIAGNOSIS — Z11.59 NEED FOR HEPATITIS C SCREENING TEST: ICD-10-CM

## 2018-09-04 DIAGNOSIS — M75.02 ADHESIVE CAPSULITIS OF LEFT SHOULDER: ICD-10-CM

## 2018-09-04 DIAGNOSIS — R25.1 TREMOR OF RIGHT HAND: ICD-10-CM

## 2018-09-04 DIAGNOSIS — E78.5 DYSLIPIDEMIA: ICD-10-CM

## 2018-09-04 DIAGNOSIS — G25.81 RLS (RESTLESS LEGS SYNDROME): ICD-10-CM

## 2018-09-04 DIAGNOSIS — K70.30 ALCOHOLIC CIRRHOSIS OF LIVER WITHOUT ASCITES: Primary | ICD-10-CM

## 2018-09-04 PROCEDURE — 99214 OFFICE O/P EST MOD 30 MIN: CPT | Mod: ,,, | Performed by: INTERNAL MEDICINE

## 2018-09-04 RX ORDER — ROPINIROLE 0.25 MG/1
0.5 TABLET, FILM COATED ORAL 3 TIMES DAILY
Qty: 270 TABLET | Refills: 3 | Status: SHIPPED | OUTPATIENT
Start: 2018-09-04 | End: 2019-10-25 | Stop reason: SDUPTHER

## 2018-09-04 NOTE — PROGRESS NOTES
Subjective:       Patient ID: Adam Aguirre is a 67 y.o. male.    Chief Complaint: Cirrhosis; RLS; and Shoulder Pain    Mr. Adam Cifuentes is a pleasant 67-year-old  male who comes for follow-up. He is underlying history of alcoholic cirrhosis of liver. Please note that he is currently abstinent completely. He's been doing fairly well within his circumstances. He has lost approximately 3 pounds of weight since his last visit. The weight seems to fluctuate a little bit here and little bit there.    Thus far he has been going okay. He takes his lactulose as needed whenever he feels that he might be little dazed or confused. He also takes neomycin in his been told that it's a gut vitamin.    Recently he seems to have some ache and pain in his left shoulder since a day or 2 and he feels that he might have slept the wrong way.    Otherwise is doing okay with Requip for restless leg.    Should also is experiencing left shoulder discomfort and is unable to raise the left shoulder. He does not recall any injury or trauma.    Patient's tremors continue on the right side of the hand. These are more or less the same. This has been going on for several years.    As far as preventive care issues are concerned, he is due for hepatitis C screening and lipid panel screening. I'm not sure if he had a hepatitis C screening done in past by Dr. Pérez but those results are not available at this time.    Past Medical History:   Diagnosis Date    Cirrhosis     Dr. Pérez    Cirrhosis, alcoholic     off alcohol now    ED (erectile dysfunction)     RLS (restless legs syndrome)      Social History     Socioeconomic History    Marital status:      Spouse name: Not on file    Number of children: Not on file    Years of education: Not on file    Highest education level: Not on file   Social Needs    Financial resource strain: Not on file    Food insecurity - worry: Not on file    Food insecurity - inability: Not on file     Transportation needs - medical: Not on file    Transportation needs - non-medical: Not on file   Occupational History    Occupation: retired [employer]   Tobacco Use    Smoking status: Current Every Day Smoker     Packs/day: 0.50     Types: Cigarettes    Smokeless tobacco: Never Used    Tobacco comment: advised to quit   Substance and Sexual Activity    Alcohol use: No     Comment: quit drinking 2008 March 25th    Drug use: No    Sexual activity: Yes     Partners: Female   Other Topics Concern    Not on file   Social History Narrative    Not on file     Past Surgical History:   Procedure Laterality Date    HEMORRHOID SURGERY      TONSILLECTOMY       Family History   Problem Relation Age of Onset    No Known Problems Mother     Heart disease Father     Prostate cancer Brother        Review of Systems   Constitutional: Positive for unexpected weight change (lost 3 lbs). Negative for activity change, appetite change, chills, fatigue and fever.   HENT: Negative for congestion, sneezing and trouble swallowing.    Eyes: Negative for pain and visual disturbance.   Respiratory: Negative for cough, chest tightness and shortness of breath.    Cardiovascular: Negative for palpitations and leg swelling.   Gastrointestinal: Negative for abdominal distention, abdominal pain, blood in stool, constipation and diarrhea.        Patient has history of alcoholic cirrhosis of liver which is under control.Takes Lactulose PRN   Endocrine: Negative for cold intolerance, heat intolerance, polydipsia, polyphagia and polyuria.   Genitourinary: Negative for dysuria, hematuria and scrotal swelling.   Musculoskeletal: Negative for arthralgias, back pain and gait problem.        Left shoulder pain and restricted range of motion.   Skin: Negative for pallor, rash and wound.   Allergic/Immunologic: Negative for environmental allergies, food allergies and immunocompromised state.   Neurological: Positive for  "tremors (rt hand) and numbness. Negative for dizziness, seizures, speech difficulty and light-headedness.        RLS symptoms and tremors rt hand  He also complains of feeling of "circulation been cut off"whenever he puts his arms in a different position.   Hematological: Negative for adenopathy. Does not bruise/bleed easily.   Psychiatric/Behavioral: Negative for agitation, behavioral problems and confusion. The patient is not nervous/anxious.          Objective:      Blood pressure (!) 142/76, pulse 73, height 5' 9" (1.753 m), weight 74.4 kg (164 lb). Body mass index is 24.22 kg/m².  Physical Exam   Constitutional: He appears well-developed.   HENT:   Head: Normocephalic and atraumatic.   Nose: Nose normal.   Mouth/Throat: Oropharynx is clear and moist. He has dentures (patient has both upper and lower dentures which are fitting jeferson.sonably well.). No oropharyngeal exudate.   Eyes: Conjunctivae and EOM are normal.   Neck: Normal range of motion. Neck supple. No JVD present. No tracheal deviation present. No thyromegaly present.   Cardiovascular: Normal rate and regular rhythm. Exam reveals no gallop and no friction rub.   Murmur heard.   Systolic murmur is present with a grade of 2/6.  Pulmonary/Chest: Effort normal and breath sounds normal. No respiratory distress. He has no wheezes. He has no rales.   Abdominal: Soft. Bowel sounds are normal. He exhibits no fluid wave, no ascites and no pulsatile midline mass. There is no hepatosplenomegaly. There is no tenderness. No hernia.   Musculoskeletal:        Left shoulder: He exhibits decreased range of motion. He exhibits no swelling, no effusion and no deformity.   Patient seems to have some decreased range of motion in the left shoulder. Peripheral pulses are good. Drop test is negative.   Neurological: He is alert. He displays tremor.   Rt hand coarse tremor-not much of her tremors seen on the left side.   Skin: Skin is warm and dry.   Some telangiectasia and " spider veins are noted on the abdominal skin.   Psychiatric: He has a normal mood and affect.   Nursing note and vitals reviewed.        Assessment:       1. Alcoholic cirrhosis of liver without ascites    2. Tremor of right hand    3. RLS (restless legs syndrome)    4. Adhesive capsulitis of left shoulder    5. Need for hepatitis C screening test    6. Dyslipidemia           No visits with results within 3 Month(s) from this visit.   Latest known visit with results is:   Office Visit on 01/30/2018   Component Date Value Ref Range Status    Ferritin 02/27/2018 211  20 - 380 ng/mL Final    Iron 02/27/2018 125  50 - 180 mcg/dL Final    Vitamin B-12 02/27/2018 566  200 - 1,100 pg/mL Final         Plan:           Alcoholic cirrhosis of liver without ascites  -     Ammonia; Future; Expected date: 09/04/2018  -     Comprehensive metabolic panel; Future; Expected date: 09/04/2018  -     CBC auto differential; Future; Expected date: 09/04/2018    Tremor of right hand    RLS (restless legs syndrome)    Adhesive capsulitis of left shoulder    Need for hepatitis C screening test  -     Hepatitis C antibody; Future; Expected date: 09/04/2018    Dyslipidemia  -     Lipid panel; Future; Expected date: 09/04/2018    Other orders  -     Cancel: Hepatitis C antibody; Future; Expected date: 09/04/2018    Patient seems to have some frozen shoulder symptoms. He might have some bursitis also. Drop test is negative. No history of injury or trauma. I recommend him some range of motion exercises for the next few weeks and if it does not get better he will let us know accordingly.    As far as alcoholic cirrhosis of liver is concerned, he is doing fairly well. He is completely abstinent from alcohol. He is fairly functional.    I'll check some labs including chemistry, hepatitis C antibody if not done and her lipid panel. Also check CBC. An ammonia levels. His previous gastroenterologist used to be Dr. Pérez was since treated  located.    Current Outpatient Medications:     cyanocobalamin, vitamin B-12, (VITAMIN B-12) 1,000 mcg Subl, Place 1 tablet under the tongue once daily., Disp: 100 tablet, Rfl: 3    lactulose (ENULOSE) 10 gram/15 mL solution, Take by mouth., Disp: , Rfl:     neomycin (MYCIFRADIN) 500 mg Tab, Take by mouth., Disp: , Rfl:     rOPINIRole (REQUIP) 0.25 MG tablet, Take 1 tablet (0.25 mg total) by mouth every evening., Disp: 30 tablet, Rfl: 4

## 2018-09-04 NOTE — PATIENT INSTRUCTIONS
Exercises for Shoulder Flexibility: External Rotation    This stretch can help restore shoulder flexibility and relieve pain over time. When stretching, be sure to breathe deeply. Follow any special instructions from your doctor or physical therapist:  1.  a doorway. Grasp the doorjamb with the hand on the frozen side. Your arm should be bent.  2. With the other hand, hold the elbow on the frozen side firmly against your body.  3. Standing in the same spot, rotate your body away from the doorjamb. Stop when you feel the stretch in the shoulder. At first, try to hold the stretch for 5 seconds.  4. Work up to doing 3 sets of this stretch, 3 times a day. Work up to holding the stretch for 30 to 60 seconds.  Note: Keep your arms as still as you can. Over time, rotate your body a little more to enhance the stretch. But be careful not to twist your back.  Frozen shoulder  Frozen shoulder is another name for adhesive capsulitis, which causes restricted movement in the shoulder. If you have frozen shoulder, this stretch may cause discomfort, especially when you first get started. A few months may pass before you achieve the results you want. But once your shoulder heals, it rarely becomes frozen again. So stick to your stretching program. If you have any questions, be sure to ask your doctor.   Date Last Reviewed: 8/16/2015 © 2000-2017 [employer]. [address] [place] [address], [place] [address]. All rights reserved. This information is not intended as a substitute for professional medical care. Always follow your healthcare professional's instructions.        Exercises for Shoulder Flexibility: Internal Rotation    This stretch can help restore shoulder flexibility and relieve pain over time. When stretching, be sure to breathe deeply. Follow any special instructions from your healthcare provider or physical therapist.  5. While seated, move the arm on the side you want to stretch toward the middle of  your back. The palm of your hand should face out.  6. Cup your other hand under the hand thats behind your back. Gently push your cupped hand upward until you feel the stretch in the shoulder. Try to hold the stretch for 5 seconds.  7. Work up to doing 3 sets of this stretch, 3 times a day. Work up to holding the stretch for 30 to 60 seconds.  Note: Keep your back straight. Its OK if your hand cant reach the middle of your back. Instead, start the stretch with your hand as close as you can get it to the middle of your back.     Frozen shoulder  Frozen shoulder is another name for adhesive capsulitis. This causes restricted movement in the shoulder. If you have frozen shoulder, this stretch may cause discomfort, especially when you first get started. A few months may pass before you achieve the results you want. But once your shoulder heals, it rarely becomes frozen again. So stick to your stretching program. If you have any questions, be sure to ask your healthcare provider.   Date Last Reviewed: 10/14/2015  © 2754-9305 [employer]. [address] [place] [address]. All rights reserved. This information is not intended as a substitute for professional medical care. Always follow your healthcare professional's instructions.        Exercises for Shoulder Flexibility: Adduction (Reaching Across)    This stretch can help restore shoulder flexibility and relieve pain over time. When stretching, be sure to breathe deeply. And follow any special instructions from your doctor or physical therapist:  8. Put the hand from the side you want to stretch on your opposite shoulder. Your elbow should point away from your body. Try to raise your elbow as close to shoulder height as you can.  9. With your other hand, push the raised elbow toward the opposite shoulder. Avoid turning your head. Stop when you feel the stretch. Try to hold the stretch for 5 seconds.  10. Work up to doing 3 sets of this  stretch, 3 times a day. Work up to holding the stretch for 30 to 60 seconds.  Note: Be sure to push your elbow across your chest, not up toward your chin. Over time, try to push your elbow farther across your chest to enhance the stretch.  Frozen shoulder  Frozen shoulder is another name for adhesive capsulitis, which causes restricted movement in the shoulder. If you have frozen shoulder, this stretch may cause discomfort, especially when you first get started. A few months may pass before you achieve the results you want. Once your shoulder heals, it rarely becomes frozen again. So stick to your stretching program. If you have any questions, be sure to ask your doctor.   Date Last Reviewed: 8/16/2015  © 7740-5777 [employer]. [address] [place] [address], [place] [address]. All rights reserved. This information is not intended as a substitute for professional medical care. Always follow your healthcare professional's instructions.        Exercises for Shoulder Flexibility: Back Scratch    Improving your flexibility can reduce pain. Stretching exercises also can help increase your range of pain-free motion. Breathe normally when you exercise. Try to use smooth, fluid movements. Never force a stretch.  Note: Follow any special instructions you are given. If you feel pain, stop the exercise. If the pain continues after stopping, call your healthcare provider.  · Stand straight, placing the back of your hand on the side you want to stretch flat against your lower back.  · Throw one end of a towel over your shoulder. Grab it behind your back with your other hand.  · Pull down gently on the towel with your front arm. Let your back arm slide up as high as is comfortable. Youll feel a stretch in your shoulder. Hold the stretch for a few seconds.  · Repeat 3 to 5 times. Build up to holding each stretch for 30 to 60 seconds.  For your safety, check with your healthcare provider before starting an exercise program.    Date Last Reviewed: 8/26/2015  © 2688-4150 [employer]. [address] [place] [address]. All rights reserved. This information is not intended as a substitute for professional medical care. Always follow your healthcare professional's instructions.        Exercises for Shoulder Flexibility: Wall Walk    Improving your flexibility can reduce pain. Stretching exercises also can help increase your range of pain-free motion. Breathe normally when you exercise. Use smooth, fluid movements.  Note: Follow any special instructions you are given. If you feel pain, stop the exercise. If the pain continues after stopping, call your healthcare provider:  · Stand with your shoulder about 2 feet from the wall.  · Raise your arm to shoulder level and gently walk your fingers up the wall as high as you can.  · Hold for a few seconds. Then walk your fingers back down.  · Repeat 3 times. Move closer to the wall as you repeat.  · Build up to holding each stretch for 30 seconds.  Caution: Do this stretch only if your healthcare provider recommends it. Dont do it when you are first injured.       Date Last Reviewed: 8/16/2015  © 2427-3622 [employer]. [address] [place] [address]. All rights reserved. This information is not intended as a substitute for professional medical care. Always follow your healthcare professional's instructions.        Understanding Frozen Shoulder    Frozen shoulder is a condition where the shoulder becomes painful and hard to move. The condition is sometimes called adhesive capsulitis.  The shoulder is a joint that is made up of many parts. These parts allow you to raise, rotate, and swing your arm. The parts of a normal shoulder are:  · Humeral head. The ball at the top of the upper arm bone (humerus).  · Scapula. The shoulder blade.  · Glenoid. The shallow socket on the scapula. (The humeral head rests on the glenoid.)  · Capsule. A sheet of tough  tissue that encloses the joint and joins the ball to the socket.  With frozen shoulder, the capsule thickens, and shrinks and pulls in (contracts). It is not clear why this happens. It may be from swelling and irritation, or from scar tissue forming. Over time, this may result in pain, stiffness, and loss of movement in the shoulder.  What causes frozen shoulder?  Experts dont know for sure why frozen shoulder occurs. Some things can make the condition more likely. These include:  · Being a woman  · Being 40 to 60 years old  · Having certain health conditions, such as diabetes or thyroid disease  · Taking certain medicines  · Not using the shoulder for a prolonged period of time, such as after an injury or surgery  Symptoms of frozen shoulder  Frozen shoulder typically occurs in 3 stages. Each stage will vary, but often lasts a few months or longer:  · Freezing stage. The shoulder is very painful. Pain often gets worse when moving your arm and at night during sleep. The shoulder gradually becomes stiffer.  · Frozen stage. The shoulder is very stiff and hard to move. Pain may be less than in the first stage. It may be hard to do daily tasks, such as dressing or bathing.  · Thawing stage. Pain and stiffness slowly get better. In time, normal or almost normal use of the shoulder returns.  Treatment for frozen shoulder  Most cases of frozen shoulder get better, even with no treatment. Treatment is done to help speed healing and help regain as much joint movement as possible. The best course of treatment will depend on your needs. It may include one or more of the following:  · Prescription or over-the-counter pain medicines. These help relieve pain and reduce swelling.  · Stretching exercises. These help restore movement to the shoulder. You may do them on your own or under the care of a physical therapist.  · Cortisone shots. These are given into your shoulder joint. They cant cure frozen shoulder. But they can help  give short-term relief from symptoms and allow you to do exercises without too much pain.  · Cold packs and heat packs. These can help relieve symptoms.  Keep in mind that getting better is a slow process. It can take a year or longer. If your shoulder doesnt get better on its own in this time, you may need surgery. This is done to loosen the tight tissues in the shoulder joint.  When to call your healthcare provider  Call your healthcare provider right away if you have any of these:  · Fever of 100.4°F (38°C) or higher, or as directed  · Symptoms that dont get better, or get worse  · New symptoms   Date Last Reviewed: 3/10/2016  © 7721-6589 [employer]. [address] [place] [address], [place] [address]. All rights reserved. This information is not intended as a substitute for professional medical care. Always follow your healthcare professional's instructions.

## 2019-01-04 LAB
ALBUMIN SERPL-MCNC: 3.7 G/DL (ref 3.6–5.1)
ALBUMIN/GLOB SERPL: 1.2 (CALC) (ref 1–2.5)
ALP SERPL-CCNC: 109 U/L (ref 40–115)
ALT SERPL-CCNC: 15 U/L (ref 9–46)
AMMONIA PLAS-SCNC: 69 UMOL/L
AST SERPL-CCNC: 23 U/L (ref 10–35)
BASOPHILS # BLD AUTO: 41 CELLS/UL (ref 0–200)
BASOPHILS NFR BLD AUTO: 0.9 %
BILIRUB SERPL-MCNC: 1 MG/DL (ref 0.2–1.2)
BUN SERPL-MCNC: 10 MG/DL (ref 7–25)
BUN/CREAT SERPL: ABNORMAL (CALC) (ref 6–22)
CALCIUM SERPL-MCNC: 9.1 MG/DL (ref 8.6–10.3)
CHLORIDE SERPL-SCNC: 102 MMOL/L (ref 98–110)
CHOLEST SERPL-MCNC: 184 MG/DL
CHOLEST/HDLC SERPL: 2.7 (CALC)
CO2 SERPL-SCNC: 34 MMOL/L (ref 20–32)
CREAT SERPL-MCNC: 0.77 MG/DL (ref 0.7–1.25)
EOSINOPHIL # BLD AUTO: 257 CELLS/UL (ref 15–500)
EOSINOPHIL NFR BLD AUTO: 5.7 %
ERYTHROCYTE [DISTWIDTH] IN BLOOD BY AUTOMATED COUNT: 12.6 % (ref 11–15)
GFR SERPL CREATININE-BSD FRML MDRD: 93 ML/MIN/1.73M2
GLOBULIN SER CALC-MCNC: 3 G/DL (CALC) (ref 1.9–3.7)
GLUCOSE SERPL-MCNC: 82 MG/DL (ref 65–99)
HCT VFR BLD AUTO: 44.3 % (ref 38.5–50)
HCV AB S/CO SERPL IA: 0.12
HCV AB SERPL QL IA: NORMAL
HDLC SERPL-MCNC: 67 MG/DL
HGB BLD-MCNC: 15.6 G/DL (ref 13.2–17.1)
LDLC SERPL CALC-MCNC: 104 MG/DL (CALC)
LYMPHOCYTES # BLD AUTO: 1638 CELLS/UL (ref 850–3900)
LYMPHOCYTES NFR BLD AUTO: 36.4 %
MCH RBC QN AUTO: 35.8 PG (ref 27–33)
MCHC RBC AUTO-ENTMCNC: 35.2 G/DL (ref 32–36)
MCV RBC AUTO: 101.6 FL (ref 80–100)
MONOCYTES # BLD AUTO: 428 CELLS/UL (ref 200–950)
MONOCYTES NFR BLD AUTO: 9.5 %
NEUTROPHILS # BLD AUTO: 2138 CELLS/UL (ref 1500–7800)
NEUTROPHILS NFR BLD AUTO: 47.5 %
NONHDLC SERPL-MCNC: 117 MG/DL (CALC)
PLATELET # BLD AUTO: 225 THOUSAND/UL (ref 140–400)
PMV BLD REES-ECKER: 9.6 FL (ref 7.5–12.5)
POTASSIUM SERPL-SCNC: 4.8 MMOL/L (ref 3.5–5.3)
PROT SERPL-MCNC: 6.7 G/DL (ref 6.1–8.1)
RBC # BLD AUTO: 4.36 MILLION/UL (ref 4.2–5.8)
SODIUM SERPL-SCNC: 140 MMOL/L (ref 135–146)
TRIGL SERPL-MCNC: 43 MG/DL
WBC # BLD AUTO: 4.5 THOUSAND/UL (ref 3.8–10.8)

## 2019-01-07 ENCOUNTER — PATIENT MESSAGE (OUTPATIENT)
Dept: FAMILY MEDICINE | Facility: CLINIC | Age: 69
End: 2019-01-07

## 2019-01-07 ENCOUNTER — OFFICE VISIT (OUTPATIENT)
Dept: FAMILY MEDICINE | Facility: CLINIC | Age: 69
End: 2019-01-07
Payer: MEDICARE

## 2019-01-07 VITALS
RESPIRATION RATE: 16 BRPM | DIASTOLIC BLOOD PRESSURE: 77 MMHG | HEART RATE: 77 BPM | WEIGHT: 165 LBS | HEIGHT: 69 IN | TEMPERATURE: 98 F | SYSTOLIC BLOOD PRESSURE: 128 MMHG | BODY MASS INDEX: 24.44 KG/M2

## 2019-01-07 DIAGNOSIS — J41.1 BRONCHITIS, MUCOPURULENT RECURRENT: ICD-10-CM

## 2019-01-07 DIAGNOSIS — J00 ACUTE NASOPHARYNGITIS: ICD-10-CM

## 2019-01-07 DIAGNOSIS — K70.30 ALCOHOLIC CIRRHOSIS OF LIVER WITHOUT ASCITES: ICD-10-CM

## 2019-01-07 DIAGNOSIS — Z23 INFLUENZA VACCINE ADMINISTERED: ICD-10-CM

## 2019-01-07 DIAGNOSIS — G25.81 RLS (RESTLESS LEGS SYNDROME): Primary | ICD-10-CM

## 2019-01-07 PROCEDURE — G0008 ADMIN INFLUENZA VIRUS VAC: HCPCS | Mod: ,,, | Performed by: INTERNAL MEDICINE

## 2019-01-07 PROCEDURE — 90662 FLU VACCINE - HIGH DOSE (65+) PRESERVATIVE FREE IM: ICD-10-PCS | Mod: ,,, | Performed by: INTERNAL MEDICINE

## 2019-01-07 PROCEDURE — G0008 FLU VACCINE - HIGH DOSE (65+) PRESERVATIVE FREE IM: ICD-10-PCS | Mod: ,,, | Performed by: INTERNAL MEDICINE

## 2019-01-07 PROCEDURE — 90662 IIV NO PRSV INCREASED AG IM: CPT | Mod: ,,, | Performed by: INTERNAL MEDICINE

## 2019-01-07 PROCEDURE — 99213 PR OFFICE/OUTPT VISIT, EST, LEVL III, 20-29 MIN: ICD-10-PCS | Mod: 25,,, | Performed by: INTERNAL MEDICINE

## 2019-01-07 PROCEDURE — 99213 OFFICE O/P EST LOW 20 MIN: CPT | Mod: 25,,, | Performed by: INTERNAL MEDICINE

## 2019-01-07 RX ORDER — PREDNISONE 20 MG/1
20 TABLET ORAL DAILY
Qty: 4 TABLET | Refills: 0 | Status: SHIPPED | OUTPATIENT
Start: 2019-01-07 | End: 2019-01-11

## 2019-01-07 RX ORDER — ALBUTEROL SULFATE 90 UG/1
2 AEROSOL, METERED RESPIRATORY (INHALATION) EVERY 6 HOURS PRN
Qty: 1 INHALER | Refills: 5 | Status: SHIPPED | OUTPATIENT
Start: 2019-01-07 | End: 2022-10-31 | Stop reason: SDUPTHER

## 2019-01-07 RX ORDER — ALBUTEROL SULFATE 90 UG/1
2 AEROSOL, METERED RESPIRATORY (INHALATION) EVERY 6 HOURS PRN
Qty: 1 INHALER | Refills: 3 | Status: SHIPPED | OUTPATIENT
Start: 2019-01-07 | End: 2019-01-07

## 2019-01-07 RX ORDER — ALBUTEROL SULFATE 90 UG/1
2 AEROSOL, METERED RESPIRATORY (INHALATION) EVERY 6 HOURS PRN
COMMUNITY
End: 2019-01-07 | Stop reason: SDUPTHER

## 2019-01-07 NOTE — PROGRESS NOTES
Subjective:       Patient ID: Adam Aguirre is a 68 y.o. male.    Chief Complaint: Cough; URI (upper  resp); and Hyperlipidemia (lab review )    Mr. Adam Cifuentes is a 68-year-old patient who comes for evaluation. He complains of upper respiratory symptoms with some mucus and expectoration for the last couple of weeks. Not sure about his fever. Underlying history of cirrhosis of liver has been noted which is stable at this point. He takes lactulose as needed. He has already finished 7 days course of antibiotics (he probably had antibiotics from a previous unused prescription )and wants to continue more.    History of restless leg syndrome and on Requip has been noted.      Cough   This is a new problem. The current episode started 1 to 4 weeks ago. The problem has been waxing and waning. The cough is productive of sputum. Associated symptoms include rhinorrhea and wheezing. Pertinent negatives include no chest pain or headaches. The treatment provided no relief. There is no history of bronchiectasis.   URI    This is a new problem. The current episode started in the past 7 days. Associated symptoms include coughing, rhinorrhea and wheezing. Pertinent negatives include no chest pain, diarrhea, headaches, neck pain or vomiting.       Past Medical History:   Diagnosis Date    Cirrhosis     Dr. 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 [employer]   Tobacco Use    Smoking status: Current Every Day Smoker     Packs/day: 0.50     Types: Cigarettes    Smokeless tobacco: Never Used    Tobacco comment: advised to quit   Substance and Sexual Activity    Alcohol use: No     Comment: quit drinking 2008 March 25th    Drug use: No    Sexual activity: Yes     Partners: Female   Other Topics Concern    Not on file   Social History Narrative    Not on file     Past Surgical History:   Procedure Laterality Date    HEMORRHOID SURGERY      TONSILLECTOMY       Family History   Problem Relation Age of Onset    No Known Problems Mother     Heart disease Father     Prostate cancer Brother        Review of Systems   Constitutional: Negative for activity change and unexpected weight change.   HENT: Positive for rhinorrhea. Negative for hearing loss and trouble swallowing.    Eyes: Negative for discharge.   Respiratory: Positive for cough and wheezing.    Cardiovascular: Negative for chest pain.   Gastrointestinal: Negative for abdominal distention, blood in stool, constipation, diarrhea and vomiting.        History of cirrhosis has been noted.   Endocrine: Negative for polydipsia and polyuria.   Genitourinary: Negative for difficulty urinating, hematuria and urgency.   Musculoskeletal: Negative for arthralgias, joint swelling and neck pain.   Neurological: Negative for weakness and headaches.   Psychiatric/Behavioral: Negative for confusion and dysphoric mood.         Objective:      Blood pressure 128/77, pulse 77, temperature 98 °F (36.7 °C), resp. rate 16, height 5' 9" (1.753 m), weight 74.8 kg (165 lb). Body mass index is 24.37 kg/m².  Physical Exam   Constitutional: He appears well-developed.   HENT:   Head: Normocephalic and atraumatic.   Right Ear: Decreased hearing is noted.   Left Ear: Decreased hearing is noted.   Nose: Nose normal.   Mouth/Throat: Oropharynx is clear and moist. He has dentures (patient has both upper and lower dentures which " are fitting jeferson.sonably well.). No oropharyngeal exudate.   Patient denies any hearing loss but he does have some trouble hearing me out and understanding.   Eyes: Conjunctivae and EOM are normal.   Neck: Normal range of motion. Neck supple. No JVD present. No tracheal deviation present. No thyromegaly present.   Cardiovascular: Normal rate and regular rhythm. Exam reveals no gallop and no friction rub.   Murmur heard.   Systolic murmur is present with a grade of 2/6.  Pulmonary/Chest: Effort normal and breath sounds normal. No respiratory distress. He has no wheezes. He has no rales.   Abdominal: Soft. Bowel sounds are normal. He exhibits no fluid wave, no ascites and no pulsatile midline mass. There is no hepatosplenomegaly. There is no tenderness. No hernia.   Musculoskeletal:        Left shoulder: He exhibits decreased range of motion. He exhibits no swelling, no effusion and no deformity.   Patient seems to have some decreased range of motion in the left shoulder. Peripheral pulses are good. Drop test is negative.   Neurological: He is alert. He displays tremor.   Rt hand coarse tremor-not much of her tremors seen on the left side.   Skin: Skin is warm and dry.   Some telangiectasia and spider veins are noted on the abdominal skin.   Psychiatric: He has a normal mood and affect.   Nursing note and vitals reviewed.        Assessment:       1. RLS (restless legs syndrome)    2. Alcoholic cirrhosis of liver without ascites    3. Acute nasopharyngitis    4. Bronchitis, mucopurulent recurrent           Orders Only on 01/03/2019   Component Date Value Ref Range Status    Cholesterol 01/03/2019 184  <200 mg/dL Final    HDL 01/03/2019 67  >40 mg/dL Final    Triglycerides 01/03/2019 43  <150 mg/dL Final    LDL Cholesterol 01/03/2019 104* mg/dL (calc) Final    HDL/Chol Ratio 01/03/2019 2.7  <5.0 (calc) Final    Non HDL Chol. (LDL+VLDL) 01/03/2019 117  <130 mg/dL (calc) Final    Glucose 01/03/2019 82  65 - 99 mg/dL  Final    BUN, Bld 01/03/2019 10  7 - 25 mg/dL Final    Creatinine 01/03/2019 0.77  0.70 - 1.25 mg/dL Final    eGFR if non African American 01/03/2019 93  > OR = 60 mL/min/1.73m2 Final    eGFR if African American 01/03/2019 108  > OR = 60 mL/min/1.73m2 Final    BUN/Creatinine Ratio 01/03/2019 NOT APPLICABLE  6 - 22 (calc) Final    Sodium 01/03/2019 140  135 - 146 mmol/L Final    Potassium 01/03/2019 4.8  3.5 - 5.3 mmol/L Final    Chloride 01/03/2019 102  98 - 110 mmol/L Final    CO2 01/03/2019 34* 20 - 32 mmol/L Final    Calcium 01/03/2019 9.1  8.6 - 10.3 mg/dL Final    Total Protein 01/03/2019 6.7  6.1 - 8.1 g/dL Final    Albumin 01/03/2019 3.7  3.6 - 5.1 g/dL Final    Globulin, Total 01/03/2019 3.0  1.9 - 3.7 g/dL (calc) Final    Albumin/Globulin Ratio 01/03/2019 1.2  1.0 - 2.5 (calc) Final    Total Bilirubin 01/03/2019 1.0  0.2 - 1.2 mg/dL Final    Alkaline Phosphatase 01/03/2019 109  40 - 115 U/L Final    AST 01/03/2019 23  10 - 35 U/L Final    ALT 01/03/2019 15  9 - 46 U/L Final    WBC 01/03/2019 4.5  3.8 - 10.8 Thousand/uL Final    RBC 01/03/2019 4.36  4.20 - 5.80 Million/uL Final    Hemoglobin 01/03/2019 15.6  13.2 - 17.1 g/dL Final    Hematocrit 01/03/2019 44.3  38.5 - 50.0 % Final    MCV 01/03/2019 101.6* 80.0 - 100.0 fL Final    MCH 01/03/2019 35.8* 27.0 - 33.0 pg Final    MCHC 01/03/2019 35.2  32.0 - 36.0 g/dL Final    RDW 01/03/2019 12.6  11.0 - 15.0 % Final    Platelets 01/03/2019 225  140 - 400 Thousand/uL Final    MPV 01/03/2019 9.6  7.5 - 12.5 fL Final    Neutrophils Absolute 01/03/2019 2,138  1,500 - 7,800 cells/uL Final    Lymph # 01/03/2019 1,638  850 - 3,900 cells/uL Final    Mono # 01/03/2019 428  200 - 950 cells/uL Final    Eos # 01/03/2019 257  15 - 500 cells/uL Final    Baso # 01/03/2019 41  0 - 200 cells/uL Final    Neutrophils Relative 01/03/2019 47.5  % Final    Lymph% 01/03/2019 36.4  % Final    Mono% 01/03/2019 9.5  % Final    Eosinophil% 01/03/2019  5.7  % Final    Basophil% 01/03/2019 0.9  % Final    Hepatitis C Ab 01/03/2019 NON-REACTIVE  NON-REACTIVE Final    Signal/Cutoff 01/03/2019 0.12  <1.00 Final    Ammonia 01/03/2019 69  < OR = 72 umol/L Final         Plan:           RLS (restless legs syndrome)    Alcoholic cirrhosis of liver without ascites    Acute nasopharyngitis    Bronchitis, mucopurulent recurrent  -     predniSONE (DELTASONE) 20 MG tablet; Take 1 tablet (20 mg total) by mouth once daily. for 4 days  Dispense: 4 tablet; Refill: 0  -     albuterol (PROAIR HFA) 90 mcg/actuation inhaler; Inhale 2 puffs into the lungs every 6 (six) hours as needed for Wheezing. Rescue  Dispense: 1 Inhaler; Refill: 3    Patient's recent labs have been reviewed. Lipid panel stable. He'll be treated with prednisone and albuterol for his bronchitis. He has already taken a course of Augmentin at home for 7 days. I do not feel further antibiotics are necessary at this point. He'll let us know by Friday as to how he is doing. Portal communication has been sent.    Current Outpatient Medications:     cyanocobalamin, vitamin B-12, (VITAMIN B-12) 1,000 mcg Subl, Place 1 tablet under the tongue once daily., Disp: 100 tablet, Rfl: 3    lactulose (ENULOSE) 10 gram/15 mL solution, Take by mouth., Disp: , Rfl:     neomycin (MYCIFRADIN) 500 mg Tab, Take by mouth., Disp: , Rfl:     rOPINIRole (REQUIP) 0.25 MG tablet, Take 2 tablets (0.5 mg total) by mouth 3 (three) times daily., Disp: 270 tablet, Rfl: 3    albuterol (PROAIR HFA) 90 mcg/actuation inhaler, Inhale 2 puffs into the lungs every 6 (six) hours as needed for Wheezing. Rescue, Disp: 1 Inhaler, Rfl: 3    predniSONE (DELTASONE) 20 MG tablet, Take 1 tablet (20 mg total) by mouth once daily. for 4 days, Disp: 4 tablet, Rfl: 0

## 2019-01-07 NOTE — PATIENT INSTRUCTIONS
Viral Upper Respiratory Illness (Adult)  You have a viral upper respiratory illness (URI), which is another term for the common cold. This illness is contagious during the first few days. It is spread through the air by coughing and sneezing. It may also be spread by direct contact (touching the sick person and then touching your own eyes, nose, or mouth). Frequent handwashing will decrease risk of spread. Most viral illnesses go away within 7 to 10 days with rest and simple home remedies. Sometimes the illness may last for several weeks. Antibiotics will not kill a virus, and they are generally not prescribed for this condition.    Home care  · If symptoms are severe, rest at home for the first 2 to 3 days. When you resume activity, don't let yourself get too tired.  · Avoid being exposed to cigarette smoke (yours or others).  · You may use acetaminophen or ibuprofen to control pain and fever, unless another medicine was prescribed. (Note: If you have chronic liver or kidney disease, have ever had a stomach ulcer or gastrointestinal bleeding, or are taking blood-thinning medicines, talk with your healthcare provider before using these medicines.) Aspirin should never be given to anyone under 18 years of age who is ill with a viral infection or fever. It may cause severe liver or brain damage.  · Your appetite may be poor, so a light diet is fine. Avoid dehydration by drinking 6 to 8 glasses of fluids per day (water, soft drinks, juices, tea, or soup). Extra fluids will help loosen secretions in the nose and lungs.  · Over-the-counter cold medicines will not shorten the length of time youre sick, but they may be helpful for the following symptoms: cough, sore throat, and nasal and sinus congestion. (Note: Do not use decongestants if you have high blood pressure.)  Follow-up care  Follow up with your healthcare provider, or as advised.  When to seek medical advice  Call your healthcare provider right away if any  of these occur:  · Cough with lots of colored sputum (mucus)  · Severe headache; face, neck, or ear pain  · Difficulty swallowing due to throat pain  · Fever of 100.4°F (38°C)  Call 911, or get immediate medical care  Call emergency services right away if any of these occur:  · Chest pain, shortness of breath, wheezing, or difficulty breathing  · Coughing up blood  · Inability to swallow due to throat pain  Date Last Reviewed: 9/13/2015  © 1556-7428 [employer]. [address] [place] [id number]. All rights reserved. This information is not intended as a substitute for professional medical care. Always follow your healthcare professional's instructions.

## 2019-01-21 ENCOUNTER — CLINICAL SUPPORT (OUTPATIENT)
Dept: FAMILY MEDICINE | Facility: CLINIC | Age: 69
End: 2019-01-21
Payer: MEDICARE

## 2019-01-21 DIAGNOSIS — Z23 PNEUMOCOCCAL VACCINE ADMINISTERED: Primary | ICD-10-CM

## 2019-01-21 PROCEDURE — G0009 ADMIN PNEUMOCOCCAL VACCINE: HCPCS | Mod: ,,, | Performed by: INTERNAL MEDICINE

## 2019-01-21 PROCEDURE — 90670 PCV13 VACCINE IM: CPT | Mod: ,,, | Performed by: INTERNAL MEDICINE

## 2019-01-21 PROCEDURE — G0009 PNEUMOCOCCAL CONJUGATE VACCINE 13-VALENT LESS THAN 5YO & GREATER THAN: ICD-10-PCS | Mod: ,,, | Performed by: INTERNAL MEDICINE

## 2019-01-21 PROCEDURE — 90670 PNEUMOCOCCAL CONJUGATE VACCINE 13-VALENT LESS THAN 5YO & GREATER THAN: ICD-10-PCS | Mod: ,,, | Performed by: INTERNAL MEDICINE

## 2019-03-09 ENCOUNTER — PATIENT MESSAGE (OUTPATIENT)
Dept: FAMILY MEDICINE | Facility: CLINIC | Age: 69
End: 2019-03-09

## 2019-03-09 DIAGNOSIS — K70.30 ALCOHOLIC CIRRHOSIS OF LIVER WITHOUT ASCITES: ICD-10-CM

## 2019-03-09 DIAGNOSIS — I85.10 SECONDARY ESOPHAGEAL VARICES WITHOUT BLEEDING: Primary | ICD-10-CM

## 2019-07-06 LAB
ALBUMIN SERPL-MCNC: 3.5 G/DL (ref 3.6–5.1)
ALBUMIN/GLOB SERPL: 1.2 (CALC) (ref 1–2.5)
ALP SERPL-CCNC: 96 U/L (ref 40–115)
ALT SERPL-CCNC: 11 U/L (ref 9–46)
AMMONIA PLAS-SCNC: 64 UMOL/L
AST SERPL-CCNC: 16 U/L (ref 10–35)
BASOPHILS # BLD AUTO: 30 CELLS/UL (ref 0–200)
BASOPHILS NFR BLD AUTO: 0.8 %
BILIRUB SERPL-MCNC: 1.1 MG/DL (ref 0.2–1.2)
BUN SERPL-MCNC: 9 MG/DL (ref 7–25)
BUN/CREAT SERPL: ABNORMAL (CALC) (ref 6–22)
CALCIUM SERPL-MCNC: 9.2 MG/DL (ref 8.6–10.3)
CHLORIDE SERPL-SCNC: 103 MMOL/L (ref 98–110)
CO2 SERPL-SCNC: 32 MMOL/L (ref 20–32)
CREAT SERPL-MCNC: 0.85 MG/DL (ref 0.7–1.25)
EOSINOPHIL # BLD AUTO: 130 CELLS/UL (ref 15–500)
EOSINOPHIL NFR BLD AUTO: 3.5 %
ERYTHROCYTE [DISTWIDTH] IN BLOOD BY AUTOMATED COUNT: 18.1 % (ref 11–15)
GFRSERPLBLD MDRD-ARVRAT: 90 ML/MIN/1.73M2
GLOBULIN SER CALC-MCNC: 2.9 G/DL (CALC) (ref 1.9–3.7)
GLUCOSE SERPL-MCNC: 99 MG/DL (ref 65–139)
HCT VFR BLD AUTO: 47.3 % (ref 38.5–50)
HGB BLD-MCNC: 16 G/DL (ref 13.2–17.1)
LYMPHOCYTES # BLD AUTO: 1432 CELLS/UL (ref 850–3900)
LYMPHOCYTES NFR BLD AUTO: 38.7 %
MCH RBC QN AUTO: 36.3 PG (ref 27–33)
MCHC RBC AUTO-ENTMCNC: 33.8 G/DL (ref 32–36)
MCV RBC AUTO: 107.3 FL (ref 80–100)
MONOCYTES # BLD AUTO: 418 CELLS/UL (ref 200–950)
MONOCYTES NFR BLD AUTO: 11.3 %
NEUTROPHILS # BLD AUTO: 1691 CELLS/UL (ref 1500–7800)
NEUTROPHILS NFR BLD AUTO: 45.7 %
PLATELET # BLD AUTO: 290 THOUSAND/UL (ref 140–400)
PMV BLD REES-ECKER: 11.6 FL (ref 7.5–12.5)
POTASSIUM SERPL-SCNC: 4.6 MMOL/L (ref 3.5–5.3)
PROT SERPL-MCNC: 6.4 G/DL (ref 6.1–8.1)
RBC # BLD AUTO: 4.41 MILLION/UL (ref 4.2–5.8)
SODIUM SERPL-SCNC: 140 MMOL/L (ref 135–146)
WBC # BLD AUTO: 3.7 THOUSAND/UL (ref 3.8–10.8)

## 2019-07-08 ENCOUNTER — OFFICE VISIT (OUTPATIENT)
Dept: FAMILY MEDICINE | Facility: CLINIC | Age: 69
End: 2019-07-08
Payer: MEDICARE

## 2019-07-08 VITALS
WEIGHT: 169 LBS | HEIGHT: 69 IN | DIASTOLIC BLOOD PRESSURE: 73 MMHG | HEART RATE: 72 BPM | SYSTOLIC BLOOD PRESSURE: 117 MMHG | BODY MASS INDEX: 25.03 KG/M2

## 2019-07-08 DIAGNOSIS — K70.30 ALCOHOLIC CIRRHOSIS OF LIVER WITHOUT ASCITES: Primary | ICD-10-CM

## 2019-07-08 DIAGNOSIS — G25.81 RLS (RESTLESS LEGS SYNDROME): ICD-10-CM

## 2019-07-08 DIAGNOSIS — R79.89 ABNORMAL CBC: ICD-10-CM

## 2019-07-08 PROCEDURE — 99214 OFFICE O/P EST MOD 30 MIN: CPT | Mod: ,,, | Performed by: INTERNAL MEDICINE

## 2019-07-08 PROCEDURE — 99214 PR OFFICE/OUTPT VISIT, EST, LEVL IV, 30-39 MIN: ICD-10-PCS | Mod: ,,, | Performed by: INTERNAL MEDICINE

## 2019-07-08 RX ORDER — LANOLIN ALCOHOL/MO/W.PET/CERES
100 CREAM (GRAM) TOPICAL DAILY
Qty: 100 TABLET | Refills: 3 | Status: SHIPPED | OUTPATIENT
Start: 2019-07-08

## 2019-07-08 NOTE — PROGRESS NOTES
Subjective:       Patient ID: Adam Aguirre is a 68 y.o. male.    Chief Complaint: Cirrhosis (lab review )    Mr. Adam Aguirre is a pleasant 68-year-old  male who comes for follow-up. He is underlying history of alcoholic cirrhosis of liver. Please note that he is currently abstinent completely. He's been doing fairly well within his circumstances. He has gained approximately 4 pounds of weight since his last visit. The weight seems to fluctuate a little bit here and little bit there.    Thus far he has been going okay. He takes his lactulose as needed whenever he feels that he might be little dazed or confused. He also takes neomycin and his been told that it's a gut vitamin.    Recent labs have been reviewed and it shows an elevation of MCV to approximately 107. Serum albumin is slightly low at 3.5. Liver enzymes are slightly on the lower side as compared to past. He does take his vitamin B-12 occasionally.  Patient's tremors continue on the right side of the hand. These are more or less the same. This has been going on for several years.    As far as preventive care issues are concerned, he is due for hepatitis C screening and lipid panel screening. I'm not sure if he had a hepatitis C screening done in past by Dr. Pérez but those results are not available at this time.    On review of labs and does seem there is a suspicion for cryoglobulins thus far he does not have any symptoms of purpura or skin lesions. He has not been diagnosed with hepatitis C. Underlying history of cirrhosis of liver has been    Past Medical History:   Diagnosis Date    Cirrhosis     Dr. Pérez    Cirrhosis, alcoholic     off alcohol now    ED (erectile dysfunction)     RLS (restless legs syndrome)      Social History     Socioeconomic History    Marital status:      Spouse name: Not on file    Number of children: Not on file    Years of education: Not on file    Highest education level: Not on file   Occupational  History    Occupation: retired [employer]  management [employer]   Social Needs    Financial resource strain: Not on file    Food insecurity:     Worry: Not on file     Inability: Not on file    Transportation needs:     Medical: Not on file     Non-medical: Not on file   Tobacco Use    Smoking status: Current Every Day Smoker     Packs/day: 0.50     Types: Cigarettes    Smokeless tobacco: Never Used    Tobacco comment: advised to quit   Substance and Sexual Activity    Alcohol use: No     Comment: quit drinking 2008 March 25th    Drug use: No    Sexual activity: Yes     Partners: Female   Lifestyle    Physical activity:     Days per week: Not on file     Minutes per session: Not on file    Stress: Not at all   Relationships    Social connections:     Talks on phone: Not on file     Gets together: Not on file     Attends [employer] service: Not on file     Active member of club or organization: Not on file     Attends meetings of clubs or organizations: Not on file     Relationship status: Not on file   Other Topics Concern    Not on file   Social History Narrative    Not on file     Past Surgical History:   Procedure Laterality Date    HEMORRHOID SURGERY      TONSILLECTOMY       Family History   Problem Relation Age of Onset    No Known Problems Mother     Heart disease Father     Prostate cancer Brother        Review of Systems   Constitutional: Positive for unexpected weight change (lost 3 lbs). Negative for activity change, appetite change, chills, fatigue and fever.   HENT: Negative for congestion, sneezing and trouble swallowing.    Eyes: Negative for pain and visual disturbance.   Respiratory: Negative for cough, chest tightness and shortness of breath.    Cardiovascular: Negative for palpitations and leg swelling.   Gastrointestinal: Negative for abdominal distention, abdominal pain, blood in stool, constipation and diarrhea.        Patient has history of alcoholic cirrhosis of liver which  "is under control.Takes Lactulose PRN   Endocrine: Negative for cold intolerance, heat intolerance, polydipsia, polyphagia and polyuria.   Genitourinary: Negative for dysuria, hematuria and scrotal swelling.   Musculoskeletal: Negative for arthralgias, back pain and gait problem.        Left shoulder pain and restricted range of motion.   Skin: Negative for pallor, rash and wound.   Allergic/Immunologic: Negative for environmental allergies, food allergies and immunocompromised state.   Neurological: Positive for tremors (rt hand) and numbness. Negative for dizziness, seizures, speech difficulty and light-headedness.        RLS symptoms and tremors rt hand  He also complains of feeling of "circulation been cut off"whenever he puts his arms in a different position.   Hematological: Negative for adenopathy. Does not bruise/bleed easily.   Psychiatric/Behavioral: Negative for agitation, behavioral problems and confusion. The patient is not nervous/anxious.          Objective:      Blood pressure 117/73, pulse 72, height 5' 9" (1.753 m), weight 76.7 kg (169 lb). Body mass index is 24.96 kg/m².  Physical Exam   Constitutional: He appears well-developed.   HENT:   Head: Normocephalic and atraumatic.   Nose: Nose normal.   Mouth/Throat: Oropharynx is clear and moist. He has dentures (patient has both upper and lower dentures which are fitting jeferson.sonably well.). No oropharyngeal exudate.   Eyes: Conjunctivae and EOM are normal.   Neck: Normal range of motion. Neck supple. No JVD present. No tracheal deviation present. No thyromegaly present.   Cardiovascular: Normal rate and regular rhythm. Exam reveals no gallop and no friction rub.   Murmur heard.   Systolic murmur is present with a grade of 2/6.  Pulmonary/Chest: Effort normal and breath sounds normal. No respiratory distress. He has no wheezes. He has no rales.   Abdominal: Soft. Bowel sounds are normal. He exhibits no fluid wave, no ascites and no pulsatile midline " mass. There is no hepatosplenomegaly. There is no tenderness. No hernia.   Musculoskeletal:        Left shoulder: He exhibits decreased range of motion. He exhibits no swelling, no effusion and no deformity.   Patient seems to have some decreased range of motion in the left shoulder. Peripheral pulses are good. Drop test is negative.   Neurological: He is alert. He displays tremor.   Rt hand coarse tremor-not much of her tremors seen on the left side.   Skin: Skin is warm and dry.   Some telangiectasia and spider veins are noted on the abdominal skin.   Psychiatric: He has a normal mood and affect.   Nursing note and vitals reviewed.        Assessment:       1. Alcoholic cirrhosis of liver without ascites    2. RLS (restless legs syndrome)    3. Abnormal CBC           No visits with results within 3 Month(s) from this visit.   Latest known visit with results is:   Patient Email on 03/09/2019   Component Date Value Ref Range Status    WBC 07/05/2019 3.7* 3.8 - 10.8 Thousand/uL Final    RBC 07/05/2019 4.41  4.20 - 5.80 Million/uL Final    Hemoglobin 07/05/2019 16.0  13.2 - 17.1 g/dL Final    Hematocrit 07/05/2019 47.3  38.5 - 50.0 % Final    Mean Corpuscular Volume 07/05/2019 107.3* 80.0 - 100.0 fL Final    Mean Corpuscular Hemoglobin 07/05/2019 36.3* 27.0 - 33.0 pg Final    Mean Corpuscular Hemoglobin Conc 07/05/2019 33.8  32.0 - 36.0 g/dL Final    RDW 07/05/2019 18.1* 11.0 - 15.0 % Final    Platelets 07/05/2019 290  140 - 400 Thousand/uL Final    MPV 07/05/2019 11.6  7.5 - 12.5 fL Final    Neutrophils Absolute 07/05/2019 1,691  1,500 - 7,800 cells/uL Final    Lymph # 07/05/2019 1,432  850 - 3,900 cells/uL Final    Mono # 07/05/2019 418  200 - 950 cells/uL Final    Eos # 07/05/2019 130  15 - 500 cells/uL Final    Baso # 07/05/2019 30  0 - 200 cells/uL Final    Neutrophils Relative 07/05/2019 45.7  % Final    Lymph% 07/05/2019 38.7  % Final    Mono% 07/05/2019 11.3  % Final    Eosinophil% 07/05/2019  3.5  % Final    Basophil% 07/05/2019 0.8  % Final    Glucose 07/05/2019 99  65 - 139 mg/dL Final    BUN, Bld 07/05/2019 9  7 - 25 mg/dL Final    Creatinine 07/05/2019 0.85  0.70 - 1.25 mg/dL Final    eGFR if non African American 07/05/2019 90  > OR = 60 mL/min/1.73m2 Final    eGFR if African American 07/05/2019 104  > OR = 60 mL/min/1.73m2 Final    BUN/Creatinine Ratio 07/05/2019 NOT APPLICABLE  6 - 22 (calc) Final    Sodium 07/05/2019 140  135 - 146 mmol/L Final    Potassium 07/05/2019 4.6  3.5 - 5.3 mmol/L Final    Chloride 07/05/2019 103  98 - 110 mmol/L Final    CO2 07/05/2019 32  20 - 32 mmol/L Final    Calcium 07/05/2019 9.2  8.6 - 10.3 mg/dL Final    Total Protein 07/05/2019 6.4  6.1 - 8.1 g/dL Final    Albumin 07/05/2019 3.5* 3.6 - 5.1 g/dL Final    Globulin, Total 07/05/2019 2.9  1.9 - 3.7 g/dL (calc) Final    Albumin/Globulin Ratio 07/05/2019 1.2  1.0 - 2.5 (calc) Final    Total Bilirubin 07/05/2019 1.1  0.2 - 1.2 mg/dL Final    Alkaline Phosphatase 07/05/2019 96  40 - 115 U/L Final    AST 07/05/2019 16  10 - 35 U/L Final    ALT 07/05/2019 11  9 - 46 U/L Final    Ammonia 07/05/2019 64  < OR = 72 umol/L Final         Plan:           Alcoholic cirrhosis of liver without ascites  -     thiamine 100 MG tablet; Take 1 tablet (100 mg total) by mouth once daily.  Dispense: 100 tablet; Refill: 3  -     Comprehensive metabolic panel; Future; Expected date: 07/08/2019  -     CBC auto differential; Future; Expected date: 07/08/2019    RLS (restless legs syndrome)    Abnormal CBC  Comments:  Mild leukopenia and elevated MCV with suspicion for cryoglobulins.  Orders:  -     CBC auto differential; Future; Expected date: 07/08/2019      Current Outpatient Medications:     albuterol (VENTOLIN HFA) 90 mcg/actuation inhaler, Inhale 2 puffs into the lungs every 6 (six) hours as needed for Wheezing. Rescue, Disp: 1 Inhaler, Rfl: 5    cyanocobalamin, vitamin B-12, (VITAMIN B-12) 1,000 mcg Subl, Place 1  tablet under the tongue once daily., Disp: 100 tablet, Rfl: 3    lactulose (ENULOSE) 10 gram/15 mL solution, Take by mouth., Disp: , Rfl:     neomycin (MYCIFRADIN) 500 mg Tab, Take by mouth., Disp: , Rfl:     rOPINIRole (REQUIP) 0.25 MG tablet, Take 2 tablets (0.5 mg total) by mouth 3 (three) times daily., Disp: 270 tablet, Rfl: 3    thiamine 100 MG tablet, Take 1 tablet (100 mg total) by mouth once daily., Disp: 100 tablet, Rfl: 3       Patient will be continued on his usual medications. I've recommended him to add thiamine 100 mg to his regimen. I'll do a little more research on his abnormal CBC and get back to him. Perhaps in the be worth getting a hematology evaluation also.

## 2019-07-08 NOTE — PATIENT INSTRUCTIONS
Cryoglobulin  Does this test have other names?  Cryocrit, cryoprotein  What is this test?  This test is done to find out if you have abnormal proteins in your blood.  Blood proteins include normal immunoglobulins, or antibodies, like IgG and IgM. But they can also include antibodies linked to autoimmune diseases. These abnormal blood proteins are dissolved in your blood at body temperature. But when you are in a cold environment, they thicken and clump together. This restricts the blood flow to your joints, muscles and organs. This can eventually lead to damage and inflammation of your blood vessels and tissues.  These abnormal proteins are called cryoglobulins. High levels of cryoglobulins may be a sign that your body is making abnormal proteins. This condition is seen with a number of disorders and conditions, such as Raynaud's syndrome, lupus, rheumatoid arthritis, Sjogren's syndrome, leukemia, and lymphoma.  Why do I need this test?  You may need this test if your healthcare provider suspects that you have a problem with your blood proteins. Symptoms tend to occur in cold weather and include:  · Skin blueness  · Numbness or tingling  · Coldness in the fingers  In more severe cases, it can also cause joint pain or tissue damage.  What other tests might I have along with this test?  Your healthcare provider may also order a joint fluid analysis if he or she suspects that you have a systemic inflammatory disease, such as lupus or rheumatoid arthritis. Your healthcare provider may also order tests to measure blood levels of other antibodies, including antinuclear antibodies, antibodies to DNA, and antibodies to phospholipids.  What do my test results mean?  Many things may affect your lab test results. These include the method each lab uses to do the test. Even if your test results are different from the normal value, you may not have a problem. To learn what the results mean for you, talk with your healthcare  provider.  A normal test is negative for cryoglobulins. This means the antibodies in your blood stay dissolved even when the blood is chilled.  If you test positive for cryoglobulins, it means these proteins became visibly sludge-like when your blood sample was refrigerated. If your cryoglobulin test is positive, your healthcare provider will do more tests to find out the cause.  How is this test done?  The test requires a blood sample, which is drawn through a needle from a vein in your arm.  Does this test pose any risks?  Taking a blood sample with a needle carries risks that include bleeding, infection, bruising, or feeling dizzy. When the needle pricks your arm, you may feel a slight stinging sensation or pain. Afterward, the site may be slightly sore.  What might affect my test results?  Other factors aren't likely to affect your results.  How do I get ready for this test?  You may need to not eat or drink anything but water for eight hours before the test.  © 8439-7055 [employer]. [address] [place] [address], [place] [address]. All rights reserved. This information is not intended as a substitute for professional medical care. Always follow your healthcare professional's instructions.

## 2019-10-25 DIAGNOSIS — G25.81 RLS (RESTLESS LEGS SYNDROME): ICD-10-CM

## 2019-10-29 RX ORDER — ROPINIROLE 0.25 MG/1
TABLET, FILM COATED ORAL
Qty: 270 TABLET | Refills: 0 | Status: SHIPPED | OUTPATIENT
Start: 2019-10-29 | End: 2020-02-13 | Stop reason: SDUPTHER

## 2019-10-31 ENCOUNTER — PATIENT MESSAGE (OUTPATIENT)
Dept: FAMILY MEDICINE | Facility: CLINIC | Age: 69
End: 2019-10-31

## 2019-10-31 DIAGNOSIS — G25.81 RLS (RESTLESS LEGS SYNDROME): ICD-10-CM

## 2019-11-02 RX ORDER — ROPINIROLE 0.25 MG/1
TABLET, FILM COATED ORAL
Qty: 270 TABLET | Refills: 0 | OUTPATIENT
Start: 2019-11-02

## 2019-11-21 PROBLEM — Z98.890 HISTORY OF ESOPHAGOGASTRODUODENOSCOPY (EGD): Status: ACTIVE | Noted: 2019-11-18

## 2019-12-26 ENCOUNTER — PATIENT MESSAGE (OUTPATIENT)
Dept: FAMILY MEDICINE | Facility: CLINIC | Age: 69
End: 2019-12-26

## 2020-01-01 ENCOUNTER — PATIENT MESSAGE (OUTPATIENT)
Dept: FAMILY MEDICINE | Facility: CLINIC | Age: 70
End: 2020-01-01

## 2020-01-02 ENCOUNTER — OFFICE VISIT (OUTPATIENT)
Dept: FAMILY MEDICINE | Facility: CLINIC | Age: 70
End: 2020-01-02
Payer: MEDICARE

## 2020-01-02 VITALS
SYSTOLIC BLOOD PRESSURE: 118 MMHG | OXYGEN SATURATION: 94 % | BODY MASS INDEX: 24.75 KG/M2 | HEART RATE: 64 BPM | DIASTOLIC BLOOD PRESSURE: 64 MMHG | WEIGHT: 167.13 LBS | HEIGHT: 69 IN

## 2020-01-02 DIAGNOSIS — B02.9 HERPES ZOSTER WITHOUT COMPLICATION: ICD-10-CM

## 2020-01-02 DIAGNOSIS — I48.91 ATRIAL FIBRILLATION WITH RVR: Primary | ICD-10-CM

## 2020-01-02 DIAGNOSIS — I81 PORTAL VEIN THROMBOSIS: ICD-10-CM

## 2020-01-02 DIAGNOSIS — I10 ESSENTIAL HYPERTENSION: ICD-10-CM

## 2020-01-02 DIAGNOSIS — K76.6 PORTAL HYPERTENSION: ICD-10-CM

## 2020-01-02 DIAGNOSIS — J01.10 ACUTE FRONTAL SINUSITIS, RECURRENCE NOT SPECIFIED: ICD-10-CM

## 2020-01-02 DIAGNOSIS — K70.30 ALCOHOLIC CIRRHOSIS OF LIVER WITHOUT ASCITES: ICD-10-CM

## 2020-01-02 DIAGNOSIS — F17.210 CIGARETTE NICOTINE DEPENDENCE WITHOUT COMPLICATION: ICD-10-CM

## 2020-01-02 DIAGNOSIS — F17.200 CURRENT SMOKER: ICD-10-CM

## 2020-01-02 PROCEDURE — 99214 OFFICE O/P EST MOD 30 MIN: CPT | Performed by: NURSE PRACTITIONER

## 2020-01-02 PROCEDURE — 1170F PR FUNCTIONAL STATUS ASSESSED: ICD-10-PCS | Mod: ,,, | Performed by: NURSE PRACTITIONER

## 2020-01-02 PROCEDURE — 1125F AMNT PAIN NOTED PAIN PRSNT: CPT | Mod: ,,, | Performed by: NURSE PRACTITIONER

## 2020-01-02 PROCEDURE — 1159F MED LIST DOCD IN RCRD: CPT | Mod: ,,, | Performed by: NURSE PRACTITIONER

## 2020-01-02 PROCEDURE — 99214 OFFICE O/P EST MOD 30 MIN: CPT | Mod: S$PBB,,, | Performed by: NURSE PRACTITIONER

## 2020-01-02 PROCEDURE — 99214 PR OFFICE/OUTPT VISIT, EST, LEVL IV, 30-39 MIN: ICD-10-PCS | Mod: S$PBB,,, | Performed by: NURSE PRACTITIONER

## 2020-01-02 PROCEDURE — 1170F FXNL STATUS ASSESSED: CPT | Mod: ,,, | Performed by: NURSE PRACTITIONER

## 2020-01-02 PROCEDURE — 1159F PR MEDICATION LIST DOCUMENTED IN MEDICAL RECORD: ICD-10-PCS | Mod: ,,, | Performed by: NURSE PRACTITIONER

## 2020-01-02 PROCEDURE — 1125F PR PAIN SEVERITY QUANTIFIED, PAIN PRESENT: ICD-10-PCS | Mod: ,,, | Performed by: NURSE PRACTITIONER

## 2020-01-02 RX ORDER — ATENOLOL 50 MG/1
25 TABLET ORAL NIGHTLY
COMMUNITY
Start: 2019-12-19 | End: 2020-11-16

## 2020-01-02 RX ORDER — DILTIAZEM HYDROCHLORIDE 120 MG/1
1 CAPSULE, COATED, EXTENDED RELEASE ORAL DAILY
COMMUNITY
Start: 2019-12-28 | End: 2020-06-17

## 2020-01-02 RX ORDER — UMECLIDINIUM BROMIDE AND VILANTEROL TRIFENATATE 62.5; 25 UG/1; UG/1
1 POWDER RESPIRATORY (INHALATION) DAILY
COMMUNITY
Start: 2019-12-30 | End: 2020-05-13 | Stop reason: SDUPTHER

## 2020-01-02 RX ORDER — IBUPROFEN 200 MG
1 TABLET ORAL DAILY
Qty: 28 PATCH | Refills: 2 | Status: SHIPPED | OUTPATIENT
Start: 2020-01-02 | End: 2020-07-10

## 2020-01-02 RX ORDER — RIVAROXABAN 10 MG/1
20 TABLET, FILM COATED ORAL DAILY
COMMUNITY
Start: 2019-12-21 | End: 2020-02-20 | Stop reason: DRUGHIGH

## 2020-01-02 RX ORDER — VALACYCLOVIR HYDROCHLORIDE 1 G/1
1000 TABLET, FILM COATED ORAL 3 TIMES DAILY
Qty: 21 TABLET | Refills: 0 | Status: SHIPPED | OUTPATIENT
Start: 2020-01-02 | End: 2021-01-14

## 2020-01-02 RX ORDER — ASPIRIN 81 MG/1
1 TABLET ORAL DAILY
COMMUNITY
Start: 2019-12-28

## 2020-01-02 RX ORDER — FUROSEMIDE 20 MG/1
20 TABLET ORAL DAILY PRN
Qty: 30 TABLET | Refills: 1 | Status: SHIPPED | OUTPATIENT
Start: 2020-01-02 | End: 2023-04-25

## 2020-01-02 RX ORDER — SILDENAFIL 100 MG/1
1 TABLET, FILM COATED ORAL
COMMUNITY

## 2020-01-02 NOTE — PATIENT INSTRUCTIONS
When to Use Antibiotics   Antibiotics are medicines used to treat infections caused by bacteria. They dont work for illnesses caused by viruses or an allergic reaction. In fact, taking antibiotics for reasons other than a bacterial infection can cause problems. For example, you may have side effects from the medicine. And if you really need an antibiotic, it may not work well.                                                                                                                                              When antibiotics wont help  Your healthcare provider wont usually prescribe antibiotics for the following conditions. You can help by not asking for them if you have:   · A cold. This type of illness is caused by a virus. It can cause a runny nose, stuffed-up nose, sneezing, coughing, headache, mild body aches, and low fever. A cold gets better on its own in a few days to a week.  · The flu (influenza). This is a respiratory illness caused by a virus. The flu usually goes away on its own in a week or so. It can cause fever, body aches, sore throat, and fatigue.  · Bronchitis. This is an infection in the lungs most often caused by a virus. You may have coughing, phlegm, body aches, and a low fever. A common type of bronchitis is known as a chest cold (acute bronchitis). This often happens after you have a respiratory infection like a common cold. Bronchitis can take weeks to go away, but antibiotics usually dont help.  · Most sore throats. Sore throats are most often caused by viruses. Your throat may feel scratchy or achy, and it may hurt to swallow. You may also have a low fever and body aches. A sore throat usually gets better in a few days.  · Most ear infections. An ear infection may be caused by a virus or bacteria. It causes pain in the ear. Antibiotics usually dont help, and the infection goes away on its own.  · Most sinus infections (sinusitis). This kind of infection causes sinus pain and  swelling, and a runny nose. In most cases, sinusitis goes away on its own, and antibiotics dont make recovery quicker.  · Allergic rhinitis. This is a set of symptoms caused by an allergic reaction. You may have sneezing, a runny nose, itchy or watery eyes, or a sore throat. Allergies are not treated with antibiotics.  · Low fever. A mild fever thats less than 100.4°F (38°C) most likely doesnt need treatment with antibiotics.   When antibiotics can help   Antibiotics can be used to treat:                                                     · Strep throat. This is a throat infectioncaused by a certain type of bacteria. Symptoms of strep throat include a sore throat, white patches on the tonsils, red spots on the roof of the mouth, fever, body aches, and nausea and vomiting.  · Urinary tract infection (UTI). This is a bacterial infection of the bladder and the tube that takes urine out of the body. It can cause burning pain and urine thats cloudy or tinted with blood. UTIs are very common. Antibiotics usually help treat these infections.  · Some ear infections. In some cases, a healthcare provider may prescribe antibiotics for an ear infection. You may need a test to show whats causing the ear infection.  · Some sinus infections. In some cases, yourhealthcare provider may give you antibiotics. He or she may first need to make sure your symptoms arent caused by a virus, fungus, allergies, or air pollutants such as smoke.   Your doctor may also recommend antibiotics if you have a condition that can affect your immune system, such as diabetes or cancer.   Self-care at home   If your infection cant be treated with antibiotics, you can take other steps to feel better. Try the remedies below. In general:   · Rest and sleep as much as needed.  · Drink water and other clear fluids.  · Dont smoke, and avoid smoke from other people.  · Use over-the-counter medicine such as acetaminophen to ease pain or fever, as  directed by your healthcare provider.   To treat sinus pain or nasal congestion:   · Put a warm, moist washcloth on your face where you feel sinus pain or pressure.  · Use a nasal spray with medicine or saline, as directed by your healthcare provider.  · Breathe in steam from a hot shower.  · Use a humidifier or cool mist vaporizer.   To quiet a cough:   · Use a humidifier or cool mist vaporizer.  · Breathe in steam from a hot shower.  · Use cough lozenges.   To sooth a sore throat:   · Suck on ice chips, popsicles, or lozenges.  · Use a sore throat spray.  · Use a humidifier or cool mist vaporizer.  · Gargle with saltwater.  · Drink warm liquids.   To ease ear pain:   · Hold a warm, moist washcloth on the ear for 10 minutes at a time.  Date Last Reviewed: 9/1/2016 © 2000-2017 [employer]. [address] [place] [address]. All rights reserved. This information is not intended as a substitute for professional medical care. Always follow your healthcare professional's instructions.        Cirrhosis    The liver is found on the right side of your belly (abdomen). It is just below the rib cage. The liver has many important jobs. It removes toxins from the blood. It also helps your blood clot to stop bleeding. Cirrhosis happens when the liver is scarred or injured. This damage is permanent. It can cause your liver to stop working (liver failure).   The two most common causes of cirrhosis are long-term heavy alcohol use and having hepatitis B or C. Other things that can damage the liver include toxins, certain medicines, and certain viruses.  Common symptoms of cirrhosis include:  · Tiredness or weakness  · Loss of appetite  · Nausea and vomiting  · Easy bleeding and bruising  · Swelling of the belly (abdomen)  · Weight loss  · Yellowing of the eyes or skin (jaundice)  · Itching  · Confusion  Treatment helps ease symptoms and prevent more liver damage. You may also get treatment to fight the  hepatitis virus. Quitting alcohol will help slow down the disease getting worse. It may also prevent more complications. If cirrhosis gets worse and becomes life threatening, you may need a liver transplant.   Home care  · Don't take medicines that can make liver damage worse. Your healthcare provider will tell you if any of the medicines you now take need to be changed. Talk with your provider before taking any medicine not prescribed. These include dietary supplements and herbs. Some of these may make liver damage worse.  · Talk with your healthcare provider about medicines that have acetaminophen or NSAIDs such as ibuprofen and naproxen. These can also harm your liver.   · Stop drinking alcohol. If you find it hard to stop drinking, seek professional help. Consider joining Alcoholics Anonymous or another type of treatment program for support.  · If you use IV drugs, you are at high risk for hepatitis B and C. Seek help to stop.   · Be sure to ask your healthcare provider about recommended vaccines. These include vaccines for viruses that can cause liver disease.  Follow-up care  Follow up with your healthcare provider or as advised by our staff.  For more information and to learn about support groups for people with liver disease, contact:  · [employer], www.liverfoundation.org, 828.498.2615  · [employer], www.hepfi.org, 163.345.8070  When to seek medical advice  Call your healthcare provider right away if you have any of the following:  · Rapid weight gain with increased size of your belly (abdomen) or leg swelling  · Yellow color of your skin or eyes (jaundice) gets worse  · Excess bleeding from cuts or injuries  Date Last Reviewed: 6/22/2015 © 2000-2017 [employer]. [address] [place] [address], [place] [address]. All rights reserved. This information is not intended as a substitute for professional medical care. Always follow your healthcare professional's  instructions.

## 2020-01-02 NOTE — PROGRESS NOTES
SUBJECTIVE:    Patient ID: Adam Aguirre is a 69 y.o. male.    Chief Complaint: Follow-up ([place] ER visit for SOB)    Patient presents today for hospital discharge follow-up since discharge from [facility].  Patient presented to the emergency room with increasing shortness of breath, extreme fatigue, and lack of energy.  Patient had been progressively declining with his respiratory status over the few days prior to emergency room visit.  Patient was diagnosed with atrial fibrillation with RVR upon admission.  About 1 week prior to her emergency room visit, patient had been seen by Gastroenterology for cirrhosis of the liver.  This is a chronic condition that is managed.  Patient reports having an abdominal ultrasound completed by Gastroenterology.  Patient reports gastroenterology diagnosed him with portal vein thrombosis and patient was placed on Xarelto outpatient.  Patient is a current smoker but started nicotine patches in the hospital of which he is continued and is requesting a prescription for at today's visit.  Patient states that he smoked 4 cigarettes today and is weaning the amount he has smoked on a daily basis.  Patient is still short of breath with reports of headaches.  Patient also has new onset rash to the right lower leg.  Patient had worsening restless legs syndrome only in the right leg while hospitalized.  Patient had continued pain in the right leg with no evidence of rash or swelling.  Upon hospital discharge, patient developed a rash on the right lower leg that has worsened.  Patient has self-diagnosed himself with shingles.  Patient is applied nothing topically to the rash but does complain of extreme sensitivity and pain to the area of concern.  Patient has an appointment scheduled with Dr. Davenport within 1 week.  Medical records from the hospital have been requested.  Patient states that he feels significantly improved since his hospital discharge and is tolerating his new  medications well.          No visits with results within 6 Month(s) from this visit.   Latest known visit with results is:   Patient Message on 03/09/2019   Component Date Value Ref Range Status    WBC 07/05/2019 3.7* 3.8 - 10.8 Thousand/uL Final    RBC 07/05/2019 4.41  4.20 - 5.80 Million/uL Final    Hemoglobin 07/05/2019 16.0  13.2 - 17.1 g/dL Final    Hematocrit 07/05/2019 47.3  38.5 - 50.0 % Final    Mean Corpuscular Volume 07/05/2019 107.3* 80.0 - 100.0 fL Final    Mean Corpuscular Hemoglobin 07/05/2019 36.3* 27.0 - 33.0 pg Final    Mean Corpuscular Hemoglobin Conc 07/05/2019 33.8  32.0 - 36.0 g/dL Final    RDW 07/05/2019 18.1* 11.0 - 15.0 % Final    Platelets 07/05/2019 290  140 - 400 Thousand/uL Final    MPV 07/05/2019 11.6  7.5 - 12.5 fL Final    Neutrophils Absolute 07/05/2019 1,691  1,500 - 7,800 cells/uL Final    Lymph # 07/05/2019 1,432  850 - 3,900 cells/uL Final    Mono # 07/05/2019 418  200 - 950 cells/uL Final    Eos # 07/05/2019 130  15 - 500 cells/uL Final    Baso # 07/05/2019 30  0 - 200 cells/uL Final    Neutrophils Relative 07/05/2019 45.7  % Final    Lymph% 07/05/2019 38.7  % Final    Mono% 07/05/2019 11.3  % Final    Eosinophil% 07/05/2019 3.5  % Final    Basophil% 07/05/2019 0.8  % Final    Glucose 07/05/2019 99  65 - 139 mg/dL Final    BUN, Bld 07/05/2019 9  7 - 25 mg/dL Final    Creatinine 07/05/2019 0.85  0.70 - 1.25 mg/dL Final    eGFR if non African American 07/05/2019 90  > OR = 60 mL/min/1.73m2 Final    eGFR if African American 07/05/2019 104  > OR = 60 mL/min/1.73m2 Final    BUN/Creatinine Ratio 07/05/2019 NOT APPLICABLE  6 - 22 (calc) Final    Sodium 07/05/2019 140  135 - 146 mmol/L Final    Potassium 07/05/2019 4.6  3.5 - 5.3 mmol/L Final    Chloride 07/05/2019 103  98 - 110 mmol/L Final    CO2 07/05/2019 32  20 - 32 mmol/L Final    Calcium 07/05/2019 9.2  8.6 - 10.3 mg/dL Final    Total Protein 07/05/2019 6.4  6.1 - 8.1 g/dL Final    Albumin  07/05/2019 3.5* 3.6 - 5.1 g/dL Final    Globulin, Total 07/05/2019 2.9  1.9 - 3.7 g/dL (calc) Final    Albumin/Globulin Ratio 07/05/2019 1.2  1.0 - 2.5 (calc) Final    Total Bilirubin 07/05/2019 1.1  0.2 - 1.2 mg/dL Final    Alkaline Phosphatase 07/05/2019 96  40 - 115 U/L Final    AST 07/05/2019 16  10 - 35 U/L Final    ALT 07/05/2019 11  9 - 46 U/L Final    Ammonia 07/05/2019 64  < OR = 72 umol/L Final       Past Medical History:   Diagnosis Date    Cirrhosis     Dr. Pérez    Cirrhosis, alcoholic     off alcohol now    ED (erectile dysfunction)     History of esophagogastroduodenoscopy (EGD) 11/18/2019    Dr. Malachi Arroyo.  No significant remarkable findings.    RLS (restless legs syndrome)      Past Surgical History:   Procedure Laterality Date    HEMORRHOID SURGERY      TONSILLECTOMY       Family History   Problem Relation Age of Onset    No Known Problems Mother     Heart disease Father     Prostate cancer Brother        Marital Status:   Alcohol History:  reports that he does not drink alcohol.  Tobacco History:  reports that he has been smoking cigarettes. He has been smoking about 0.50 packs per day. He has never used smokeless tobacco.  Drug History:  reports that he does not use drugs.    Review of patient's allergies indicates:  No Known Allergies    Current Outpatient Medications:     albuterol (VENTOLIN HFA) 90 mcg/actuation inhaler, Inhale 2 puffs into the lungs every 6 (six) hours as needed for Wheezing. Rescue, Disp: 1 Inhaler, Rfl: 5    ANORO ELLIPTA 62.5-25 mcg/actuation DsDv, Inhale 1 puff into the lungs once daily., Disp: , Rfl:     aspirin (ECOTRIN) 81 MG EC tablet, Take 1 tablet by mouth once daily., Disp: , Rfl:     atenolol (TENORMIN) 50 MG tablet, Take 1 tablet by mouth once daily., Disp: , Rfl:     cyanocobalamin, vitamin B-12, (VITAMIN B-12) 1,000 mcg Subl, Place 1 tablet under the tongue once daily., Disp: 100 tablet, Rfl: 3    diltiaZEM (CARDIZEM CD) 120  MG Cp24, Take 1 capsule by mouth once daily., Disp: , Rfl:     lactulose (ENULOSE) 10 gram/15 mL solution, Take 10 g by mouth as needed. , Disp: , Rfl:     rOPINIRole (REQUIP) 0.25 MG tablet, TAKE 2 TABLETS(0.5 MG) BY MOUTH THREE TIMES DAILY, Disp: 270 tablet, Rfl: 0    sildenafil (VIAGRA) 100 MG tablet, Take 1 tablet by mouth as needed., Disp: , Rfl:     thiamine 100 MG tablet, Take 1 tablet (100 mg total) by mouth once daily., Disp: 100 tablet, Rfl: 3    XARELTO 10 mg Tab, Take 1 tablet by mouth once daily., Disp: , Rfl:     furosemide (LASIX) 20 MG tablet, Take 1 tablet (20 mg total) by mouth daily as needed., Disp: 30 tablet, Rfl: 1    neomycin (MYCIFRADIN) 500 mg Tab, Take by mouth., Disp: , Rfl:     nicotine (NICODERM CQ) 21 mg/24 hr, Place 1 patch onto the skin once daily., Disp: 28 patch, Rfl: 2    valACYclovir (VALTREX) 1000 MG tablet, Take 1 tablet (1,000 mg total) by mouth 3 (three) times daily. for 7 days, Disp: 21 tablet, Rfl: 0    Review of Systems   Constitutional: Negative for appetite change, chills, diaphoresis, fatigue, fever and unexpected weight change.   HENT: Positive for sinus pressure. Negative for congestion, ear discharge, ear pain, hearing loss, sore throat, trouble swallowing and voice change.    Eyes: Negative for photophobia, pain and visual disturbance.   Respiratory: Negative for cough, chest tightness and shortness of breath.         COPD, shortness of breath, smoker   Cardiovascular: Negative for chest pain, palpitations and leg swelling.        Atrial fibrillation with RVR, hypertension   Gastrointestinal: Negative for constipation, diarrhea, nausea and vomiting.        Chronic liver cirrhosis   Endocrine: Negative for cold intolerance and heat intolerance.   Genitourinary: Negative for difficulty urinating, dysuria and flank pain.   Musculoskeletal: Negative for arthralgias, gait problem and myalgias.   Skin: Positive for color change and rash.   Allergic/Immunologic:  "Negative for immunocompromised state.   Neurological: Negative for dizziness and weakness.   Hematological: Negative for adenopathy.   Psychiatric/Behavioral: Negative for agitation, confusion, self-injury and suicidal ideas.            Objective:      Vitals:    01/02/20 1530   BP: 118/64   Pulse: 64   SpO2: (!) 94%   Weight: 75.8 kg (167 lb 1.6 oz)   Height: 5' 9" (1.753 m)     Physical Exam   Constitutional: He is oriented to person, place, and time. He appears well-developed.   HENT:   Head: Normocephalic and atraumatic.   Nose: Right sinus exhibits frontal sinus tenderness. Left sinus exhibits frontal sinus tenderness.   Mouth/Throat: Uvula is midline and oropharynx is clear and moist. No oropharyngeal exudate.   Eyes: Pupils are equal, round, and reactive to light. Conjunctivae, EOM and lids are normal.   Neck: Normal range of motion. Neck supple.   Cardiovascular: Normal rate, regular rhythm, S1 normal, S2 normal, normal heart sounds, intact distal pulses and normal pulses.  No extrasystoles are present. Exam reveals no S3, no S4, no distant heart sounds and no friction rub.   No murmur heard.  Pulmonary/Chest: Effort normal. No stridor. No respiratory distress. He has no decreased breath sounds. He has no wheezes.   Shallow breathing noted throughout.  No wheezing, rhonchi, or rales   Abdominal: Soft. Bowel sounds are normal. He exhibits no distension.   Musculoskeletal: Normal range of motion.   Lymphadenopathy:     He has no cervical adenopathy.   Neurological: He is alert and oriented to person, place, and time.   Skin: Skin is warm and dry. Rash noted. Rash is vesicular.        Vesicular rash noted to anterior right lower leg.  No drainage.  Erythema noted.   Psychiatric: He has a normal mood and affect. His speech is normal and behavior is normal. Judgment and thought content normal.   Nursing note and vitals reviewed.        Assessment:       1. Atrial fibrillation with RVR    2. Alcoholic cirrhosis of " liver without ascites    3. Portal hypertension    4. Portal vein thrombosis    5. Current smoker    6. Essential hypertension    7. Cigarette nicotine dependence without complication    8. Herpes zoster without complication    9. Acute frontal sinusitis, recurrence not specified         Plan:       Atrial fibrillation with RVR  Follow-up appointment with Dr. Davenport scheduled within 1 week.  Appointment visit today patient has RRR.    Alcoholic cirrhosis of liver without ascites  Stable.  Continue follow-up with Gastroenterology for management.    Portal hypertension  Continue current medication.    Portal vein thrombosis  Continue current medication.  Patient denies referral for Cardiology at today's visit.  Patient would like to follow up with Dr. Davenport.    Current smoker  -     nicotine (NICODERM CQ) 21 mg/24 hr; Place 1 patch onto the skin once daily.  Dispense: 28 patch; Refill: 2    Essential hypertension  -     furosemide (LASIX) 20 MG tablet; Take 1 tablet (20 mg total) by mouth daily as needed.  Dispense: 30 tablet; Refill: 1    Cigarette nicotine dependence without complication  -     nicotine (NICODERM CQ) 21 mg/24 hr; Place 1 patch onto the skin once daily.  Dispense: 28 patch; Refill: 2    Herpes zoster without complication  -     valACYclovir (VALTREX) 1000 MG tablet; Take 1 tablet (1,000 mg total) by mouth 3 (three) times daily. for 7 days  Dispense: 21 tablet; Refill: 0    Acute frontal sinusitis, recurrence not specified  Patient denies antibiotic at today's visit.    Follow-up with Cardiology as scheduled.  Follow up in 1-2 weeks if rash is not improving.  Patient denies pain medication prescription.    Medications have been reviewed and reconciled post hospital discharge at today's visit.    Follow up in about 4 weeks (around 1/30/2020) for copd, cirrhosis, bp.          Transitional Care Note    Family and/or Caretaker present at visit?  No.  Diagnostic tests reviewed/disposition: No  "diagnosic tests pending after this hospitalization.  Disease/illness education: Completed  Home health/community services discussion/referrals: Patient does not have home health established from hospital visit.  They do not need home health.  If needed, we will set up home health for the patient.   Establishment or re-establishment of referral orders for community resources: No other necessary community resources.   Discussion with other health care providers: No discussion with other health care providers necessary.           Discharge Information     Discharge Date:  Awaiting hospital records          Primary Discharge Diagnosis:  Afib with RVR         How patient is feeling since discharge from the hospital?  Improved          Medication & Order Review     Discharge Medication Review:    Medication reconciliation performed Yes   If no, state reason why not performed: N/A       Did patient have any difficulty/problems filling prescriptions?  No           If yes, state reason and steps taken to assist in resolving issue: N/A     Does patient have any questions regarding medications?  No           If yes, state question and steps taken to answer question:  N/A    Was Home Health and/or any equipment ordered for patient upon discharge?  No            Red Flag Review     Was patient educated on "red flags" or things to watch for?  No       If yes:  "Red flags" patient was told to watch for:     Shortness of breath                Chest pain                Leg swelling               Is patient experiencing any red flags today (or any of these symptoms) (List examples of red flags specifically)?  No                Is patient experiencing any red flags today (or any of these symptoms) (List examples of red flags specifically)?  No      Notes:  None    Patient Education & Follow Up               Phone number patient will call if having any questions or problems:  Patient was able to state the phone number for [facility] accurately.    Appointment scheduled?  Yes      Follow-up/transition of care appointment, including the date/time and location of your appointment:  Patient was able to state the follow-up appointment correctly.        Notes:  None          Provided patient with date, time and location of follow-up appointment if they do not have it:  Done      Rescheduled transition of care appointment if the patient has a conflict:    Appointment re-scheduled?  No        Patient informed of this appointment?  N/A      Notes:

## 2020-01-14 ENCOUNTER — PATIENT MESSAGE (OUTPATIENT)
Dept: FAMILY MEDICINE | Facility: CLINIC | Age: 70
End: 2020-01-14

## 2020-01-16 ENCOUNTER — PATIENT MESSAGE (OUTPATIENT)
Dept: FAMILY MEDICINE | Facility: CLINIC | Age: 70
End: 2020-01-16

## 2020-02-03 DIAGNOSIS — I81 PORTAL VEIN THROMBOSIS: Primary | ICD-10-CM

## 2020-02-12 ENCOUNTER — HOSPITAL ENCOUNTER (OUTPATIENT)
Dept: RADIOLOGY | Facility: HOSPITAL | Age: 70
Discharge: HOME | End: 2020-02-12
Attending: INTERNAL MEDICINE
Payer: MEDICARE

## 2020-02-12 DIAGNOSIS — I81 PORTAL VEIN THROMBOSIS: ICD-10-CM

## 2020-02-12 DIAGNOSIS — G25.81 RLS (RESTLESS LEGS SYNDROME): ICD-10-CM

## 2020-02-12 PROCEDURE — 93975 VASCULAR STUDY: CPT | Mod: TC,PO

## 2020-02-12 RX ORDER — ROPINIROLE 0.25 MG/1
TABLET, FILM COATED ORAL
Qty: 270 TABLET | Refills: 0 | OUTPATIENT
Start: 2020-02-12

## 2020-05-19 DIAGNOSIS — I81 PORTAL VEIN THROMBOSIS: Primary | ICD-10-CM

## 2021-01-14 PROBLEM — I48.0 PAF (PAROXYSMAL ATRIAL FIBRILLATION): Status: ACTIVE | Noted: 2021-01-14

## 2021-01-14 PROBLEM — I10 ESSENTIAL HYPERTENSION: Status: ACTIVE | Noted: 2021-01-14

## 2021-01-14 PROBLEM — I81 PORTAL VEIN THROMBOSIS: Status: ACTIVE | Noted: 2021-01-14

## 2021-08-19 PROBLEM — R06.09 DOE (DYSPNEA ON EXERTION): Status: ACTIVE | Noted: 2021-08-19

## 2021-11-17 PROBLEM — K74.60 HEPATIC CIRRHOSIS: Status: RESOLVED | Noted: 2017-06-08 | Resolved: 2021-11-17

## 2023-11-08 PROBLEM — I10 ESSENTIAL HYPERTENSION: Status: RESOLVED | Noted: 2021-01-14 | Resolved: 2023-11-08
